# Patient Record
Sex: FEMALE | Race: WHITE | Employment: FULL TIME | URBAN - METROPOLITAN AREA
[De-identification: names, ages, dates, MRNs, and addresses within clinical notes are randomized per-mention and may not be internally consistent; named-entity substitution may affect disease eponyms.]

---

## 2017-04-17 ENCOUNTER — OFFICE VISIT (OUTPATIENT)
Dept: FAMILY MEDICINE CLINIC | Age: 37
End: 2017-04-17

## 2017-04-17 VITALS
DIASTOLIC BLOOD PRESSURE: 70 MMHG | BODY MASS INDEX: 21.98 KG/M2 | SYSTOLIC BLOOD PRESSURE: 100 MMHG | HEART RATE: 125 BPM | TEMPERATURE: 97.8 F | WEIGHT: 153.2 LBS

## 2017-04-17 DIAGNOSIS — J40 BRONCHITIS: Primary | ICD-10-CM

## 2017-04-17 DIAGNOSIS — R00.2 PALPITATIONS: ICD-10-CM

## 2017-04-17 PROCEDURE — 99213 OFFICE O/P EST LOW 20 MIN: CPT | Performed by: FAMILY MEDICINE

## 2017-04-17 RX ORDER — AZITHROMYCIN 250 MG/1
TABLET, FILM COATED ORAL
Qty: 1 PACKET | Refills: 0 | Status: SHIPPED | OUTPATIENT
Start: 2017-04-17 | End: 2017-04-27

## 2017-04-18 ENCOUNTER — TELEPHONE (OUTPATIENT)
Dept: FAMILY MEDICINE CLINIC | Age: 37
End: 2017-04-18

## 2017-04-25 ENCOUNTER — TELEPHONE (OUTPATIENT)
Dept: FAMILY MEDICINE CLINIC | Age: 37
End: 2017-04-25

## 2017-04-25 NOTE — TELEPHONE ENCOUNTER
Patient called to let you know she has rescheduled her Echocardiogram for next week (from tomorrow). She wanted to make sure the Prior Auth was done so she would not have to pay out of pocket.

## 2017-04-25 NOTE — TELEPHONE ENCOUNTER
The patient called to check on the status of a prior authorization that was needed for an echocardiogram she was needing to have performed. I informed her that it was denied intitally by her insurance company and they were requesting to speak with the order physician to see if it could be approved. I let her know that Dr. Teetee Thompson was out of the office today and would be back tomorrow to look over this. She has her test scheduled for 9 am tomorrow morning and wasn't sure if she would need to go ahead and reschedule it so she wouldn't have to pay out of pocket. Please let her know if she should reschedule for a later date.

## 2017-04-25 NOTE — TELEPHONE ENCOUNTER
Lazara Barakat with [facility] is calling regarding a echo cardiogram the patient is having completed at their facility tomorrow. They are needing to have a Prior Authorization sent to them in order for the patient to have this completed. Please give them a call if you have any further questions regarding this.

## 2017-04-25 NOTE — TELEPHONE ENCOUNTER
i called samuel they denied the echo PA  Said if the ordering doctor would like to call and talk the number is [phone]  They said it did not meet medical criteria

## 2017-04-26 NOTE — TELEPHONE ENCOUNTER
I called and received approval   Valid 4/25-5/24/17  #[id number]    Of course, as they always say, subject to any co-pays or deductible that is applicable

## 2017-04-28 ENCOUNTER — TELEPHONE (OUTPATIENT)
Dept: FAMILY MEDICINE CLINIC | Age: 37
End: 2017-04-28

## 2017-04-28 NOTE — TELEPHONE ENCOUNTER
I only wrote one order and it is in Epic: ECHO complete 2D with doppler  The PA approval says transthoracic, but my order does not (though I don;t see the difference as they are the same- the ABRIL- transesophageal ECHO- is the different one)

## 2017-04-28 NOTE — TELEPHONE ENCOUNTER
Shruthi was gone for the day, however scheduling was aware of this and I did advise of Dr. Edmundo Coker note that our order was for the ECHO complete 2D with Doppler. She said that was all she needed and this would be taken care of.

## 2017-04-28 NOTE — TELEPHONE ENCOUNTER
Shruthi with [facility] is calling regarding the order they have for the patients upcoming echo cardiogram.  She said that in addition to the order they received for the Echo Complete 2D w/doppler , they also received an order for a  Resting transthoracic  Echo. She would like to verify exactly which order Dr. Alice Flores would like the patient to receive.

## 2017-05-05 ENCOUNTER — TELEPHONE (OUTPATIENT)
Dept: FAMILY MEDICINE CLINIC | Age: 37
End: 2017-05-05

## 2017-05-17 ENCOUNTER — OFFICE VISIT (OUTPATIENT)
Dept: FAMILY MEDICINE CLINIC | Age: 37
End: 2017-05-17

## 2017-05-17 VITALS
DIASTOLIC BLOOD PRESSURE: 50 MMHG | OXYGEN SATURATION: 99 % | WEIGHT: 156.8 LBS | SYSTOLIC BLOOD PRESSURE: 90 MMHG | HEART RATE: 93 BPM | BODY MASS INDEX: 22.5 KG/M2

## 2017-05-17 DIAGNOSIS — I47.1 PSVT (PAROXYSMAL SUPRAVENTRICULAR TACHYCARDIA) (HCC): Primary | ICD-10-CM

## 2017-05-17 DIAGNOSIS — Z3A.25 25 WEEKS GESTATION OF PREGNANCY: ICD-10-CM

## 2017-05-17 PROCEDURE — 99214 OFFICE O/P EST MOD 30 MIN: CPT | Performed by: FAMILY MEDICINE

## 2017-05-17 ASSESSMENT — ENCOUNTER SYMPTOMS
SHORTNESS OF BREATH: 0
ABDOMINAL PAIN: 0
CHEST TIGHTNESS: 0

## 2018-07-26 ENCOUNTER — OFFICE VISIT (OUTPATIENT)
Dept: FAMILY MEDICINE CLINIC | Age: 38
End: 2018-07-26

## 2018-07-26 VITALS
BODY MASS INDEX: 18.98 KG/M2 | HEIGHT: 71 IN | TEMPERATURE: 98.2 F | HEART RATE: 97 BPM | WEIGHT: 135.6 LBS | RESPIRATION RATE: 12 BRPM | OXYGEN SATURATION: 99 % | DIASTOLIC BLOOD PRESSURE: 60 MMHG | SYSTOLIC BLOOD PRESSURE: 100 MMHG

## 2018-07-26 DIAGNOSIS — R19.7 DIARRHEA OF PRESUMED INFECTIOUS ORIGIN: Primary | ICD-10-CM

## 2018-07-26 DIAGNOSIS — F41.8 SITUATIONAL ANXIETY: ICD-10-CM

## 2018-07-26 PROCEDURE — 99213 OFFICE O/P EST LOW 20 MIN: CPT | Performed by: PHYSICIAN ASSISTANT

## 2018-07-26 RX ORDER — ONDANSETRON 4 MG/1
4 TABLET, ORALLY DISINTEGRATING ORAL EVERY 8 HOURS PRN
Qty: 15 TABLET | Refills: 1 | Status: SHIPPED | OUTPATIENT
Start: 2018-07-26 | End: 2019-10-09

## 2018-07-26 ASSESSMENT — PATIENT HEALTH QUESTIONNAIRE - PHQ9
2. FEELING DOWN, DEPRESSED OR HOPELESS: 0
SUM OF ALL RESPONSES TO PHQ QUESTIONS 1-9: 0
SUM OF ALL RESPONSES TO PHQ9 QUESTIONS 1 & 2: 0
1. LITTLE INTEREST OR PLEASURE IN DOING THINGS: 0

## 2018-07-26 NOTE — PATIENT INSTRUCTIONS
Out of work today. Increase fluids. Use Zofran for nausea. Evaristo Willis diarPatient Education        Diarrhea: Care Instructions  Your Care Instructions    Diarrhea is loose, watery stools (bowel movements). The exact cause is often hard to find. Sometimes diarrhea is your body's way of getting rid of what caused an upset stomach. Viruses, food poisoning, and many medicines can cause diarrhea. Some people get diarrhea in response to emotional stress, anxiety, or certain foods. Almost everyone has diarrhea now and then. It usually isn't serious, and your stools will return to normal soon. The important thing to do is replace the fluids you have lost, so you can prevent dehydration. The doctor has checked you carefully, but problems can develop later. If you notice any problems or new symptoms, get medical treatment right away. Follow-up care is a key part of your treatment and safety. Be sure to make and go to all appointments, and call your doctor if you are having problems. It's also a good idea to know your test results and keep a list of the medicines you take. How can you care for yourself at home? · Watch for signs of dehydration, which means your body has lost too much water. Dehydration is a serious condition and should be treated right away. Signs of dehydration are:  ¨ Increasing thirst and dry eyes and mouth. ¨ Feeling faint or lightheaded. ¨ Darker urine, and a smaller amount of urine than normal.  · To prevent dehydration, drink plenty of fluids, enough so that your urine is light yellow or clear like water. Choose water and other caffeine-free clear liquids until you feel better. If you have kidney, heart, or liver disease and have to limit fluids, talk with your doctor before you increase the amount of fluids you drink. · Begin eating small amounts of mild foods the next day, if you feel like it. ¨ Try yogurt that has live cultures of Lactobacillus.  (Check the label.)  ¨ Avoid spicy foods, fruits,

## 2018-11-12 ENCOUNTER — OFFICE VISIT (OUTPATIENT)
Dept: FAMILY MEDICINE CLINIC | Age: 38
End: 2018-11-12
Payer: MEDICARE

## 2018-11-12 VITALS
BODY MASS INDEX: 19.49 KG/M2 | RESPIRATION RATE: 18 BRPM | HEIGHT: 71 IN | HEART RATE: 67 BPM | WEIGHT: 139.2 LBS | DIASTOLIC BLOOD PRESSURE: 72 MMHG | OXYGEN SATURATION: 98 % | SYSTOLIC BLOOD PRESSURE: 112 MMHG | TEMPERATURE: 98.1 F

## 2018-11-12 DIAGNOSIS — J20.9 ACUTE BRONCHITIS, UNSPECIFIED ORGANISM: Primary | ICD-10-CM

## 2018-11-12 PROCEDURE — 99213 OFFICE O/P EST LOW 20 MIN: CPT | Performed by: PHYSICIAN ASSISTANT

## 2018-11-12 RX ORDER — BENZONATATE 100 MG/1
100 CAPSULE ORAL 3 TIMES DAILY PRN
Qty: 30 CAPSULE | Refills: 0 | Status: SHIPPED | OUTPATIENT
Start: 2018-11-12 | End: 2019-10-09 | Stop reason: ALTCHOICE

## 2018-11-12 RX ORDER — AZITHROMYCIN 250 MG/1
TABLET, FILM COATED ORAL
Qty: 1 PACKET | Refills: 0 | Status: SHIPPED | OUTPATIENT
Start: 2018-11-12 | End: 2019-10-09 | Stop reason: ALTCHOICE

## 2019-10-09 ENCOUNTER — OFFICE VISIT (OUTPATIENT)
Dept: FAMILY MEDICINE CLINIC | Age: 39
End: 2019-10-09
Payer: MEDICARE

## 2019-10-09 VITALS
BODY MASS INDEX: 21.61 KG/M2 | SYSTOLIC BLOOD PRESSURE: 98 MMHG | TEMPERATURE: 98.1 F | HEIGHT: 71 IN | HEART RATE: 69 BPM | OXYGEN SATURATION: 100 % | WEIGHT: 154.4 LBS | RESPIRATION RATE: 16 BRPM | DIASTOLIC BLOOD PRESSURE: 58 MMHG

## 2019-10-09 DIAGNOSIS — K58.0 IRRITABLE BOWEL SYNDROME WITH DIARRHEA: Primary | ICD-10-CM

## 2019-10-09 PROBLEM — I47.1 PSVT (PAROXYSMAL SUPRAVENTRICULAR TACHYCARDIA) (HCC): Status: ACTIVE | Noted: 2019-10-09

## 2019-10-09 PROBLEM — H52.13 MYOPIA, BILATERAL: Status: ACTIVE | Noted: 2019-10-09

## 2019-10-09 PROBLEM — H40.1131 PRIMARY OPEN ANGLE GLAUCOMA OF BOTH EYES, MILD STAGE: Status: ACTIVE | Noted: 2019-10-09

## 2019-10-09 PROBLEM — I47.10 PSVT (PAROXYSMAL SUPRAVENTRICULAR TACHYCARDIA): Status: ACTIVE | Noted: 2019-10-09

## 2019-10-09 PROCEDURE — 99213 OFFICE O/P EST LOW 20 MIN: CPT | Performed by: PHYSICIAN ASSISTANT

## 2019-10-09 ASSESSMENT — PATIENT HEALTH QUESTIONNAIRE - PHQ9
2. FEELING DOWN, DEPRESSED OR HOPELESS: 0
SUM OF ALL RESPONSES TO PHQ QUESTIONS 1-9: 0
1. LITTLE INTEREST OR PLEASURE IN DOING THINGS: 0
SUM OF ALL RESPONSES TO PHQ9 QUESTIONS 1 & 2: 0
SUM OF ALL RESPONSES TO PHQ QUESTIONS 1-9: 0

## 2019-11-25 ENCOUNTER — OFFICE VISIT (OUTPATIENT)
Dept: FAMILY MEDICINE CLINIC | Age: 39
End: 2019-11-25
Payer: MEDICARE

## 2019-11-25 VITALS
WEIGHT: 155 LBS | OXYGEN SATURATION: 99 % | DIASTOLIC BLOOD PRESSURE: 68 MMHG | SYSTOLIC BLOOD PRESSURE: 99 MMHG | BODY MASS INDEX: 21.7 KG/M2 | HEART RATE: 74 BPM | HEIGHT: 71 IN

## 2019-11-25 DIAGNOSIS — I47.1 PSVT (PAROXYSMAL SUPRAVENTRICULAR TACHYCARDIA) (HCC): ICD-10-CM

## 2019-11-25 DIAGNOSIS — H40.1131 PRIMARY OPEN ANGLE GLAUCOMA OF BOTH EYES, MILD STAGE: ICD-10-CM

## 2019-11-25 DIAGNOSIS — Z00.00 ROUTINE GENERAL MEDICAL EXAMINATION AT A HEALTH CARE FACILITY: Primary | ICD-10-CM

## 2019-11-25 DIAGNOSIS — Z86.19 H/O VARICELLA: ICD-10-CM

## 2019-11-25 LAB
A/G RATIO: 2 (ref 1.1–2.2)
ALBUMIN SERPL-MCNC: 4.8 G/DL (ref 3.4–5)
ALP BLD-CCNC: 50 U/L (ref 40–129)
ALT SERPL-CCNC: 12 U/L (ref 10–40)
ANION GAP SERPL CALCULATED.3IONS-SCNC: 16 MMOL/L (ref 3–16)
AST SERPL-CCNC: 16 U/L (ref 15–37)
BASOPHILS ABSOLUTE: 0 K/UL (ref 0–0.2)
BASOPHILS RELATIVE PERCENT: 0.7 %
BILIRUB SERPL-MCNC: 0.6 MG/DL (ref 0–1)
BUN BLDV-MCNC: 9 MG/DL (ref 7–20)
CALCIUM SERPL-MCNC: 9.1 MG/DL (ref 8.3–10.6)
CHLORIDE BLD-SCNC: 99 MMOL/L (ref 99–110)
CHOLESTEROL, TOTAL: 157 MG/DL (ref 0–199)
CO2: 24 MMOL/L (ref 21–32)
CREAT SERPL-MCNC: 0.7 MG/DL (ref 0.6–1.1)
EOSINOPHILS ABSOLUTE: 0.1 K/UL (ref 0–0.6)
EOSINOPHILS RELATIVE PERCENT: 1.3 %
GFR AFRICAN AMERICAN: >60
GFR NON-AFRICAN AMERICAN: >60
GLOBULIN: 2.4 G/DL
GLUCOSE BLD-MCNC: 93 MG/DL (ref 70–99)
HCT VFR BLD CALC: 42.4 % (ref 36–48)
HDLC SERPL-MCNC: 77 MG/DL (ref 40–60)
HEMOGLOBIN: 14.2 G/DL (ref 12–16)
LDL CHOLESTEROL CALCULATED: 64 MG/DL
LYMPHOCYTES ABSOLUTE: 1.5 K/UL (ref 1–5.1)
LYMPHOCYTES RELATIVE PERCENT: 25.3 %
MCH RBC QN AUTO: 30.3 PG (ref 26–34)
MCHC RBC AUTO-ENTMCNC: 33.5 G/DL (ref 31–36)
MCV RBC AUTO: 90.5 FL (ref 80–100)
MONOCYTES ABSOLUTE: 0.4 K/UL (ref 0–1.3)
MONOCYTES RELATIVE PERCENT: 6.8 %
NEUTROPHILS ABSOLUTE: 3.9 K/UL (ref 1.7–7.7)
NEUTROPHILS RELATIVE PERCENT: 65.9 %
PDW BLD-RTO: 12.8 % (ref 12.4–15.4)
PLATELET # BLD: 178 K/UL (ref 135–450)
PMV BLD AUTO: 9.2 FL (ref 5–10.5)
POTASSIUM SERPL-SCNC: 3.7 MMOL/L (ref 3.5–5.1)
RBC # BLD: 4.69 M/UL (ref 4–5.2)
SODIUM BLD-SCNC: 139 MMOL/L (ref 136–145)
TOTAL PROTEIN: 7.2 G/DL (ref 6.4–8.2)
TRIGL SERPL-MCNC: 81 MG/DL (ref 0–150)
TSH SERPL DL<=0.05 MIU/L-ACNC: 1.59 UIU/ML (ref 0.27–4.2)
VLDLC SERPL CALC-MCNC: 16 MG/DL
WBC # BLD: 5.9 K/UL (ref 4–11)

## 2019-11-25 PROCEDURE — 36415 COLL VENOUS BLD VENIPUNCTURE: CPT | Performed by: FAMILY MEDICINE

## 2019-11-25 PROCEDURE — 99395 PREV VISIT EST AGE 18-39: CPT | Performed by: FAMILY MEDICINE

## 2019-11-25 RX ORDER — METOPROLOL SUCCINATE 50 MG/1
50 TABLET, EXTENDED RELEASE ORAL DAILY
COMMUNITY

## 2020-01-09 ENCOUNTER — TELEPHONE (OUTPATIENT)
Dept: FAMILY MEDICINE CLINIC | Age: 40
End: 2020-01-09

## 2020-02-03 ENCOUNTER — OFFICE VISIT (OUTPATIENT)
Dept: ORTHOPEDIC SURGERY | Age: 40
End: 2020-02-03
Payer: MEDICARE

## 2020-02-03 VITALS — RESPIRATION RATE: 16 BRPM | WEIGHT: 151 LBS | HEIGHT: 71 IN | BODY MASS INDEX: 21.14 KG/M2

## 2020-02-03 PROCEDURE — 99203 OFFICE O/P NEW LOW 30 MIN: CPT | Performed by: PHYSICIAN ASSISTANT

## 2020-02-03 PROCEDURE — L1812 KO ELASTIC W/JOINTS PRE OTS: HCPCS | Performed by: PHYSICIAN ASSISTANT

## 2020-02-03 NOTE — PROGRESS NOTES
Patient: Stormy Mishra    MRN: [id number]  YOB: 1980          Age: 42 y.o. Sex: female    Subjective     Chief Complaint:  Knee Pain (LT KNEE: FELL THIS MORNING WHEN SHE WAS LEAVING FOR WORK. PATELLA DISLOCATION, STATES SHE MANUALLY RELOVATED IT.)      History of Present Illness:  Stormy Mishra is a 42 y.o. female who presents today for evaluation of left anterior knee pain. Patient states that she was leaving this morning to go to work when she was stepped out into her garage and her knee gave way and she fell onto her child's diaper bag. She states that her kneecap was visibly lateral in her knee. She did physically reduce the patella and was able to straighten her leg. She did have immediate swelling. Since the injury she has been ambulating with a considerable limp secondary to left knee pain. She states that she has had a previous patellar dislocation over 20 years ago. Pain at the present time is over the anterior medial aspect of the left knee within the medial patellofemoral joint.     Pain Assessment  Location of Pain: Knee  Location Modifiers: Left  Severity of Pain: 2  Quality of Pain: Aching, Dull, Grinding, Other (Comment), Buckling  Duration of Pain: Persistent  Frequency of Pain: Constant  Date Pain First Started: 02/03/20  Aggravating Factors: Stretching, Straightening, Bending, Exercise, Kneeling, Squatting, Standing, Walking, Stairs  Limiting Behavior: Yes  Relieving Factors: Rest  Result of Injury: Yes  Work-Related Injury: No  Are there other pain locations you wish to document?: No      Medical History  Current Medications:   Current Outpatient Medications   Medication Sig Dispense Refill    metoprolol succinate (TOPROL XL) 50 MG extended release tablet Take 50 mg by mouth daily      hyoscyamine (LEVSIN/SL) 125 MCG sublingual tablet Place 1 tablet under the tongue every 4 hours as needed for Cramping 60 tablet 1    Latanoprost (XALATAN OP) Apply instability noted with varus and valgus stress testing or with anterior posterior stress testing. Skin: There are no rashes, ulcerations or lesions    Gait: With a limp favoring the left knee    Distal neurovascular is grossly intact    Radiology:  X-rays obtained and reviewed in office:  Views: AP, lateral, sunrise view, tunnel view left knee  Location(s): Left knee  Impression: There is slight patellar tilt noted on the sunrise view with a small bone fragment noted over the medial patellar facet    Assessment:  Left knee patellar dislocation    Impression:   Encounter Diagnoses   Name Primary?  Left knee pain, unspecified chronicity Yes    Dislocation of left patella, initial encounter        Office Procedures:  Orders Placed This Encounter   Procedures    XR KNEE LEFT (MIN 4 VIEWS)     Standing Status:   Future     Number of Occurrences:   1     Standing Expiration Date:   3/3/2020   [address] [place] [address] , Orthopedic Surgery, [facility]     Referral Priority:   Routine     Referral Type:   Eval and Treat     Referral Reason:   Specialty Services Required     Referred to Provider:   Indigo Begum DO     Requested Specialty:   Orthopedic Surgery     Number of Visits Requested:   1    Breg Hinged Lateral Stabilizer Knee Brace     Patient was prescribed a Breg Hinged Lateral Stabilizer. The left knee will require stabilization / immobilization from this semi-rigid / rigid orthosis to improve their function. The orthosis will assist in protecting the affected area, provide functional support and facilitate healing. The patient was educated and fit by a healthcare professional with expert knowledge and specialization in brace application while under the direct supervision of the physician. Verbal and written instructions for the use of and application of this item were provided.    They were instructed to contact the office immediately should the brace result in increased pain, decreased

## 2020-02-17 ENCOUNTER — OFFICE VISIT (OUTPATIENT)
Dept: ORTHOPEDIC SURGERY | Age: 40
End: 2020-02-17
Payer: COMMERCIAL

## 2020-02-17 VITALS — HEIGHT: 71 IN | BODY MASS INDEX: 21.14 KG/M2 | WEIGHT: 151.01 LBS

## 2020-02-17 PROBLEM — S83.005A PATELLAR DISLOCATION, LEFT, INITIAL ENCOUNTER: Status: ACTIVE | Noted: 2020-02-17

## 2020-02-17 PROCEDURE — 99203 OFFICE O/P NEW LOW 30 MIN: CPT | Performed by: ORTHOPAEDIC SURGERY

## 2020-02-17 NOTE — PROGRESS NOTES
MCL, PCL, LCL are intact with stress exam.  There negative crepitus noted with range of motion under the patella. A positive grind test.  negative Thania's and Apley compression test.   Patient has increased mobility with lateral translation of the patella, 3+ quadrants with some mild apprehension. Skin: There are no rashes, ulcerations or lesions. Gait: Normal    Reflex: not tested    Additional Examinations:  Right Lower Extremity: Examination of the right lower extremity does not show any tenderness, deformity or injury. Range of motion is unremarkable. There is no gross instability. There are no rashes, ulcerations or lesions. Strength and tone are normal.  Patient is hypermobile with ability to hyperextend elbows, has hyperextension past 90 degrees at the pinky, hyperextension at the knees and increased patellar mobility on the right as well as left. LUMBAR SPINE: The skin is warm and dry. There is no swelling, warmth, or erythema. Range of motion is within normal limits. There is no paraspinal or spinous process tenderness. Ipsilateral and contralateral straight leg raising tests are negative. The distal neurovascular exam is grossly intact and symmetric. X-RAYS: 4 views weightbearing AP, lateral. PA and sunrise of the left knee were reviewed, they show no periosteal reaction, medullary lesions, or osteopenia. Joint spaces are well maintained. No evidence of fracture or dislocation. Assessment : Left knee patella dislocation    Impression:  Encounter Diagnosis   Name Primary?     Patellar dislocation, left, initial encounter Yes       Office Procedures:  Orders Placed This Encounter   Procedures    [address] [place] [address] Physical Therapy     Referral Priority:   Routine     Referral Type:   Eval and Treat     Referral Reason:   Specialty Services Required     Requested Specialty:   Physical Therapy     Number of Visits Requested:   1     No orders of the defined types were placed in this encounter. Treatment Plan: We reviewed patient's injury and treatment options. This is first dislocation in at least 25 years. Patient will proceed with conservative treatment including a patella stabilizing brace and physical therapy. If she sustains further dislocations we will consider MRI for surgical intervention. Patient agrees with this plan, all of their questions were answered best of our ability and to their satisfaction.         Debo Martinez

## 2020-02-20 ENCOUNTER — HOSPITAL ENCOUNTER (OUTPATIENT)
Dept: PHYSICAL THERAPY | Age: 40
Setting detail: THERAPIES SERIES
Discharge: HOME | End: 2020-02-20
Payer: COMMERCIAL

## 2020-02-20 PROCEDURE — 97161 PT EVAL LOW COMPLEX 20 MIN: CPT

## 2020-02-20 PROCEDURE — 97110 THERAPEUTIC EXERCISES: CPT

## 2020-02-20 PROCEDURE — 97140 MANUAL THERAPY 1/> REGIONS: CPT

## 2020-02-20 NOTE — PLAN OF CARE
throughout but no EDS diagnosis. Patient reports less active since having first child 4 years    Relevant Medical History:pripr dislocation at 17 yo no sx  Functional Disability Index: LEFS 31%    Pain Scale: 5-6/10  Easing factors: rest, ice, brace  Provocative factors: weight bearing out of brace     Type: []Constant   [x]Intermittent  []Radiating []Localized []other:     Numbness/Tingling: none    Occupation/School: desk job    Living Status/Prior Level of Function: Independent with ADLs and IADLs, caring for young children    OBJECTIVE:     ROM LEFT RIGHT   Knee ext 0 +2   Knee Flex 140 140   Strength  LEFT RIGHT   HIP Abductors 4- 4+   HIP Ext 4 4+   Knee EXT (quad) 4- poor VMO 5   Knee Flex (HS) 4+ 5     Reflexes/Sensation:  NT   []Dermatomes/Myotomes intact    []Reflexes equal and normal bilaterally   []Other:    Joint mobility:    []Normal    []Hypo   [x]Hyper 4+ mm lateral translation of patella    Palpation: TTP around medial aspect of knee, mild effusion present    Functional Mobility/Transfers: independent    Posture: anterior pelvic tilt, genu valgum in single leg stance    Bandages/Dressings/Incisions: n/a    Gait: (include devices/WB status) slight antalgic gait, decreased stance time on L LE, decreased knee flexion on L LE    Orthopedic Special Tests: n/a                       [x] Patient history, allergies, meds reviewed. Medical chart reviewed. See intake form. Review Of Systems (ROS):  [x]Performed Review of systems (Integumentary, CardioPulmonary, Neurological) by intake and observation. Intake form has been scanned into medical record. Patient has been instructed to contact their primary care physician regarding ROS issues if not already being addressed at this time.       Co-morbidities/Complexities (which will affect course of rehabilitation):   [x]None           Arthritic conditions   []Rheumatoid arthritis (M05.9)  []Osteoarthritis (M19.91)   Cardiovascular conditions   []Hypertension Goals: To be achieved in: 10 weeks  1. Disability index score of 20% or less for the LEFS to assist with reaching prior level of function. [] Progressing: [] Met: [] Not Met: [] Adjusted  2. Patient will demonstrate increased AROM to 0-145 painfree to allow for proper joint functioning as indicated by patients Functional Deficits. [] Progressing: [] Met: [] Not Met: [] Adjusted  3. Patient will demonstrate an increase in Strength to good proximal hip strength and control, at least 4+/5 LE to allow for proper functional mobility as indicated by patients Functional Deficits. [] Progressing: [] Met: [] Not Met: [] Adjusted  4. Patient will return to  activities without increased symptoms or restriction. [] Progressing: [] Met: [] Not Met: [] Adjusted  5. Patient will be able to ascend/descend stairs with reciprocal pattern for return to normal ADLs.    [] Progressing: [] Met: [] Not Met: [] Adjusted      Electronically signed by:  Oracio Pope, [address] [facility] DPT, [id number]

## 2020-02-20 NOTE — FLOWSHEET NOTE
RESTRICTIONS/PRECAUTIONS: patellar dislocation    Exercises/Interventions:     Therapeutic Ex Sets/reps Notes HEP   Quad set with ADD 10 x 10\"  X   SLR/SLR in ER 15 x Fatigue at end, pain at end of ER X      X   Clamshells 2 x 12  X   Bridges with ADD 2 x 12  X      X                                                               Manual Intervention      PROM, ITB STM 5 min     Pt ed anatomy, surgery, RICE, PT progression, prognosis 10 min                             NMR re-education                                                              Access Code: [email or web address]   URL: [email or web address]   Date: 02/20/2020   Prepared by: Colin Amador     Exercises  Supine Straight Leg Hip Adduction and Quad Set with Graham Hoes - 10 reps - 10 seconds hold - 1-2x daily  Supine Straight Leg Raises - 10 reps - 2 sets - 1-2x daily  Straight Leg Raise with External Rotation - 10 reps - 2 sets - 1-2x daily  Supine Bridge with Mini Swiss Ball Between Knees - 10 reps - 2 sets - 3 seconds hold - 1-2x daily  Clamshell with Resistance - 10 reps - 2 sets - 1-2x daily    Therapeutic Exercise and NMR EXR  [x] (55436) Provided verbal/tactile cueing for activities related to strengthening, flexibility, endurance, ROM for improvements in LE, proximal hip, and core control with self care, mobility, lifting, ambulation.  [] (66136) Provided verbal/tactile cueing for activities related to improving balance, coordination, kinesthetic sense, posture, motor skill, proprioception  to assist with LE, proximal hip, and core control in self care, mobility, lifting, ambulation and eccentric single leg control.      NMR and Therapeutic Activities:    [x] (30427 or 89816) Provided verbal/tactile cueing for activities related to improving balance, coordination, kinesthetic sense, posture, motor skill, proprioception and motor activation to allow for proper function of core, proximal hip and LE with self care and ADLs  [] (94334) Gait Re-education- Provided training and instruction to the patient for proper LE, core and proximal hip recruitment and positioning and eccentric body weight control with ambulation re-education including up and down stairs     Home Exercise Program:    [x] (56572) Reviewed/Progressed HEP activities related to strengthening, flexibility, endurance, ROM of core, proximal hip and LE for functional self-care, mobility, lifting and ambulation/stair navigation   [] (39474)Reviewed/Progressed HEP activities related to improving balance, coordination, kinesthetic sense, posture, motor skill, proprioception of core, proximal hip and LE for self care, mobility, lifting, and ambulation/stair navigation      Manual Treatments:  PROM / STM / Oscillations-Mobs:  G-I, II, III, IV (PA's, Inf., Post.)  [x] (50903) Provided manual therapy to mobilize LE, proximal hip and/or LS spine soft tissue/joints for the purpose of modulating pain, promoting relaxation,  increasing ROM, reducing/eliminating soft tissue swelling/inflammation/restriction, improving soft tissue extensibility and allowing for proper ROM for normal function with self care, mobility, lifting and ambulation. Modalities:       [] GR/ESU 15 min    [] GR 15 min  [] ESU     [] CP    [] MHP    [x] declined     Charges:  Timed Code Treatment Minutes: 40   Total Treatment Minutes: 60     [x] EVAL (LOW) 56030 (typically 20 minutes face-to-face)  [] EVAL (MOD) 30026 (typically 30 minutes face-to-face)  [] EVAL (HIGH) 85424 (typically 45 minutes face-to-face)  [] RE-EVAL     [x] SV(97477) x  2   [] IONTO  [] NMR (39645) x     [] VASO  [x] Manual (81927) x 1    [] Other:  [] TA x      [] Mech Traction (38775)  [] ES(attended) (49566)      [] ES (un) (30769):      GOALS: Patient stated goal: Avoid surgery and reduce risk of dislocating again  []? Progressing: []? Met: []? Not Met: []? Adjusted     Therapist goals for Patient:   Short Term Goals: To be achieved in: 2 weeks  1.  Independent in Demonstrates poor VMO firing and atrophy. Hypermobility of patella, lateral tightness. Patient would benefit from skilled PT to increase strength and control of knee to reduce risk of recurrent patella dislocations and avoid surgical procedure. Treatment/Activity Tolerance:  [x] Patient tolerated treatment well [] Patient limited by fatigue  [] Patient limited by pain  [] Patient limited by other medical complications  [] Other:     Patient Requires Follow-up: [x] Yes  [] No    PLAN: See eval  [] Continue per plan of care [] Alter current plan (see comments above)  [x] Plan of care initiated [] Hold pending MD visit [] Discharge      Electronically signed by:  Sue Barakat PT , DPT [id number]    Note: If patient does not return for scheduled/ recommended follow up visits, this note will serve as a discharge from care along with most recent update on progress.

## 2020-02-26 ENCOUNTER — HOSPITAL ENCOUNTER (OUTPATIENT)
Dept: PHYSICAL THERAPY | Age: 40
Setting detail: THERAPIES SERIES
Discharge: HOME | End: 2020-02-26
Payer: COMMERCIAL

## 2020-02-26 PROCEDURE — 97140 MANUAL THERAPY 1/> REGIONS: CPT

## 2020-02-26 PROCEDURE — 97110 THERAPEUTIC EXERCISES: CPT

## 2020-02-26 NOTE — FLOWSHEET NOTE
[facility],  [address]  Phone: [phone]  Fax [phone]    Physical Therapy Daily Treatment Note  Date:  2020    Patient Name:  Hyun Porter    :  1980  MRN: [id number]  Restrictions/Precautions:    Physician Information:  Referring Practitioner: Dr. Vineet Orona  Medical/Treatment Diagnosis Information:  · Diagnosis: S83.005A (ICD-10-CM) - Patellar dislocation, left, initial encounter  · Treatment Diagnosis: Left patellar dislocation S83.005D, left knee pain M25.562, left knee stiffness M25.662, left knee effusion M25.662   [x] Conservative / [] Surgical - DOS:  Therapy Diagnosis/Practice Pattern:  Practice Pattern D: Connective Tissue Dysfunction  Insurance/Certification information:  PT Insurance Information: [employer]  $10CP-100%- 20PT-NEEDS AUTH AFTER 10V  Plan of care signed: [x] YES  [] NO  Number of Comorbidities:  []0     [x]1-2    []3+  Date of Patient follow up with Physician:     Is this a Progress Report:     []  Yes  [x]  No        If Yes:  Date Range for reporting period:  Beginning 2020  Ending    Progress report will be due (10 Rx or 30 days whichever is less):        Recertification will be due (POC Duration  / 90 days whichever is less): 2020     Progress Note: [x]  Yes  []  No  Next due by: Visit #10        Latex Allergy:  [x]NO      []YES  Preferred Language for Healthcare:   [x]English       []other:    Visit # Insurance Allowable Reporting Period    (auth after 10) Begin Date: 2020               End Date:        SUBJECTIVE:  Reports HEP got easier the more she did it.  Still uncomfortable coming out of brace at this point    OBJECTIVE: See eval   Observation:   Palpation:     Test used Initial score Current Score   Pain Summary VAS 5-6    Functional questionnaire LEFS 31%    ROM flexion 140 slight discomfort at end     extension 0    Strength quad 4- poor VMO     ABD 4-     flexion 4+         RESTRICTIONS/PRECAUTIONS: patellar dislocation    Exercises/Interventions:     Therapeutic Ex Sets/reps Notes HEP   Quad set with ADD 10 x 10\"  X   SLR/SLR in ER 2 x 10 each Fatigue at end, pain at end of ER X   SL ABD 15 x   X   Clamshells 2 x 12 OVL X   Bridges with ADD 2 x 12  X   LBW 2 laps OVL X   Lateral touch down 2 x 10 4\" cues for valgum    Leg press 5 min 40# with ADD squeeze                                                    Manual Intervention      PROM, ITB STM 8 min     Pt ed anatomy, surgery, RICE, PT progression, prognosis                              NMR re-education                                                              Access Code: [email or web address]   URL: [email or web address]   Date: 02/20/2020   Prepared by: Colin Amador     Exercises  Supine Straight Leg Hip Adduction and Quad Set with Graham Hoes - 10 reps - 10 seconds hold - 1-2x daily  Supine Straight Leg Raises - 10 reps - 2 sets - 1-2x daily  Straight Leg Raise with External Rotation - 10 reps - 2 sets - 1-2x daily  Supine Bridge with Mini Swiss Ball Between Knees - 10 reps - 2 sets - 3 seconds hold - 1-2x daily  Clamshell with Resistance - 10 reps - 2 sets - 1-2x daily    Therapeutic Exercise and NMR EXR  [x] (45953) Provided verbal/tactile cueing for activities related to strengthening, flexibility, endurance, ROM for improvements in LE, proximal hip, and core control with self care, mobility, lifting, ambulation.  [] (98153) Provided verbal/tactile cueing for activities related to improving balance, coordination, kinesthetic sense, posture, motor skill, proprioception  to assist with LE, proximal hip, and core control in self care, mobility, lifting, ambulation and eccentric single leg control.      NMR and Therapeutic Activities:    [x] (28390 or 19812) Provided verbal/tactile cueing for activities related to improving balance, coordination, kinesthetic sense, posture, motor skill, proprioception and motor activation to allow for proper function of core, proximal hip and LE with self care and ADLs  [] (49316) Gait Re-education- Provided training and instruction to the patient for proper LE, core and proximal hip recruitment and positioning and eccentric body weight control with ambulation re-education including up and down stairs     Home Exercise Program:    [x] (41872) Reviewed/Progressed HEP activities related to strengthening, flexibility, endurance, ROM of core, proximal hip and LE for functional self-care, mobility, lifting and ambulation/stair navigation   [] (15133)Reviewed/Progressed HEP activities related to improving balance, coordination, kinesthetic sense, posture, motor skill, proprioception of core, proximal hip and LE for self care, mobility, lifting, and ambulation/stair navigation      Manual Treatments:  PROM / STM / Oscillations-Mobs:  G-I, II, III, IV (PA's, Inf., Post.)  [x] (73510) Provided manual therapy to mobilize LE, proximal hip and/or LS spine soft tissue/joints for the purpose of modulating pain, promoting relaxation,  increasing ROM, reducing/eliminating soft tissue swelling/inflammation/restriction, improving soft tissue extensibility and allowing for proper ROM for normal function with self care, mobility, lifting and ambulation.      Modalities:       [] GR/ESU 15 min    [] GR 15 min  [] ESU     [] CP    [] MHP    [x] declined     Charges:  Timed Code Treatment Minutes: 40   Total Treatment Minutes: 50     [] EVAL (LOW) 32967 (typically 20 minutes face-to-face)  [] EVAL (MOD) 64259 (typically 30 minutes face-to-face)  [] EVAL (HIGH) 70117 (typically 45 minutes face-to-face)  [] RE-EVAL     [x] LJ(46771) x  2   [] IONTO  [] NMR (28184) x     [] VASO  [x] Manual (48248) x 1    [] Other:  [] TA x      [] Mech Traction (20704)  [] ES(attended) (44506)      [] ES (un) (23394):      GOALS: Patient stated goal: Avoid surgery and reduce risk of dislocating again  []? Progressing: []? Met: []? Not Met: []? Adjusted     Therapist goals for Patient:   Short Term Goals: To be achieved in: 2 weeks  1. Independent in HEP and progression per patient tolerance, in order to prevent re-injury. [x]? Progressing: []? Met: []? Not Met: []? Adjusted  2. Patient will have a decrease in pain to facilitate improvement in movement, function, and ADLs as indicated by Functional Deficits. [x]? Progressing: []? Met: []? Not Met: []? Adjusted     Long Term Goals: To be achieved in: 10 weeks  1. Disability index score of 20% or less for the LEFS to assist with reaching prior level of function. [x]? Progressing: []? Met: []? Not Met: []? Adjusted  2. Patient will demonstrate increased AROM to 0-145 painfree to allow for proper joint functioning as indicated by patients Functional Deficits. [x]? Progressing: []? Met: []? Not Met: []? Adjusted  3. Patient will demonstrate an increase in Strength to good proximal hip strength and control, at least 4+/5 LE to allow for proper functional mobility as indicated by patients Functional Deficits. [x]? Progressing: []? Met: []? Not Met: []? Adjusted  4. Patient will return to  activities without increased symptoms or restriction. [x]? Progressing: []? Met: []? Not Met: []? Adjusted  5. Patient will be able to ascend/descend stairs with reciprocal pattern for return to normal ADLs. [x]? Progressing: []? Met: []? Not Met: []? Adjusted      Overall Progression Towards Functional goals/ Treatment Progress Update:  [x] Patient is progressing as expected towards functional goals listed. [] Progression is slowed due to complexities/Impairments listed. [] Progression has been slowed due to co-morbidities.   [] Plan just implemented, too soon to assess goals progression <30days   [] Goals require adjustment due to lack of progress  [] Patient is not progressing as expected and requires additional follow up with physician  []

## 2020-03-04 ENCOUNTER — HOSPITAL ENCOUNTER (OUTPATIENT)
Dept: PHYSICAL THERAPY | Age: 40
Setting detail: THERAPIES SERIES
Discharge: HOME | End: 2020-03-04
Payer: COMMERCIAL

## 2020-03-04 PROCEDURE — 97110 THERAPEUTIC EXERCISES: CPT

## 2020-03-04 PROCEDURE — 97112 NEUROMUSCULAR REEDUCATION: CPT

## 2020-03-04 NOTE — FLOWSHEET NOTE
[facility],  [address]  Phone: [phone]  Fax [phone]    Physical Therapy Daily Treatment Note  Date:  3/4/2020    Patient Name:  Divya Shelley    :  1980  MRN: [id number]  Restrictions/Precautions:    Physician Information:  Referring Practitioner: Dr. Jaswinder Sims  Medical/Treatment Diagnosis Information:  · Diagnosis: S83.005A (ICD-10-CM) - Patellar dislocation, left, initial encounter  · Treatment Diagnosis: Left patellar dislocation S83.005D, left knee pain M25.562, left knee stiffness M25.662, left knee effusion M25.662   [x] Conservative / [] Surgical - DOS:  Therapy Diagnosis/Practice Pattern:  Practice Pattern D: Connective Tissue Dysfunction  Insurance/Certification information:  PT Insurance Information: [employer]  $10CP-100%- 20PT-NEEDS AUTH AFTER 10V  Plan of care signed: [x] YES  [] NO  Number of Comorbidities:  []0     [x]1-2    []3+  Date of Patient follow up with Physician:     Is this a Progress Report:     []  Yes  [x]  No        If Yes:  Date Range for reporting period:  Beginning 2020  Ending    Progress report will be due (10 Rx or 30 days whichever is less): 3/43/7966       Recertification will be due (POC Duration  / 90 days whichever is less): 2020     Progress Note: [x]  Yes  []  No  Next due by: Visit #10        Latex Allergy:  [x]NO      []YES  Preferred Language for Healthcare:   [x]English       []other:    Visit # Insurance Allowable Reporting Period   3 20 (auth after 10) Begin Date: 3/4/2020               End Date:        SUBJECTIVE:  Reports some achyness in knee. Using brace most of time. Was more active over weekend and it swelled some, did not ice.     OBJECTIVE: See eval   Observation:   Palpation:     Test used Initial score Current Score   Pain Summary VAS 5-6    Functional questionnaire LEFS 31%    ROM flexion 140 slight discomfort at end     extension 0    Strength quad 4- poor VMO     ABD 4-     flexion 4+         RESTRICTIONS/PRECAUTIONS: patellar dislocation    Exercises/Interventions:     Therapeutic Ex/NMR Sets/reps Notes HEP   Quad set with ADD 10 x 10\"  X   SLR/SLR in ER 2 x 10 each Fatigue at end, pain at end of ER X   SL ABD 15 x   X   Clamshells 2 x 12 OVL X   Bridges with ADD 2 x 12  X   LBW  [place] 2 laps  15 x OVL X   Lateral touch down 2 x 10 4\" cues for valgum    Leg press 5 min 40# with ADD squeeze                                                    Manual Intervention      PROM, ITB STM 5 min     Pt ed anatomy, surgery, RICE, PT progression, prognosis                                                                                            Access Code: [email or web address]   URL: [email or web address]   Date: 02/20/2020   Prepared by: García Hernandez     Exercises  Supine Straight Leg Hip Adduction and Quad Set with Taiwo Given - 10 reps - 10 seconds hold - 1-2x daily  Supine Straight Leg Raises - 10 reps - 2 sets - 1-2x daily  Straight Leg Raise with External Rotation - 10 reps - 2 sets - 1-2x daily  Supine Bridge with Mini Swiss Ball Between Knees - 10 reps - 2 sets - 3 seconds hold - 1-2x daily  Clamshell with Resistance - 10 reps - 2 sets - 1-2x daily    Therapeutic Exercise and NMR EXR  [x] (66504) Provided verbal/tactile cueing for activities related to strengthening, flexibility, endurance, ROM for improvements in LE, proximal hip, and core control with self care, mobility, lifting, ambulation.  [] (03103) Provided verbal/tactile cueing for activities related to improving balance, coordination, kinesthetic sense, posture, motor skill, proprioception  to assist with LE, proximal hip, and core control in self care, mobility, lifting, ambulation and eccentric single leg control.      NMR and Therapeutic Activities:    [x] (13803 or 75035) Provided verbal/tactile cueing for activities related to improving balance, coordination, physician  [] Other    Prognosis for POC: [x] Good [] Fair  [] Poor      Patient requires continued skilled intervention: [x] Yes  [] No    ASSESSMENT:  Patient tolerated treatment well. Demonstrates poor VMO firing and atrophy. Hypermobility of patella, lateral tightness. Patient would benefit from skilled PT to increase strength and control of knee to reduce risk of recurrent patella dislocations and avoid surgical procedure. Treatment/Activity Tolerance:  [x] Patient tolerated treatment well [] Patient limited by fatigue  [] Patient limited by pain  [] Patient limited by other medical complications  [] Other:     Patient Requires Follow-up: [x] Yes  [] No    PLAN: See eval  [x] Continue per plan of care [] Alter current plan (see comments above)  [] Plan of care initiated [] Hold pending MD visit [] Discharge      Electronically signed by:  Mirian Platt PT , DPT [id number]    Note: If patient does not return for scheduled/ recommended follow up visits, this note will serve as a discharge from care along with most recent update on progress.

## 2020-03-11 ENCOUNTER — HOSPITAL ENCOUNTER (OUTPATIENT)
Dept: PHYSICAL THERAPY | Age: 40
Setting detail: THERAPIES SERIES
Discharge: HOME | End: 2020-03-11
Payer: COMMERCIAL

## 2020-03-11 NOTE — FLOWSHEET NOTE
[facility], [address]        Physical Therapy  Cancellation/No-show Note  Patient Name:  Ayleen Webb  :  1980   Date:  3/11/2020  Cancelled visits to date: 1  No-shows to date: 0    For today's appointment patient:  ? X Cancelled  ? Rescheduled appointment  ? No-show     Reason given by patient:  ? X Patient ill  ? Conflicting appointment  ? No transportation    ? Conflict with work  ? No reason given  ?   Other:     Comments:      Electronically signed by:  Jacqueline Santos PT

## 2020-03-18 ENCOUNTER — HOSPITAL ENCOUNTER (OUTPATIENT)
Dept: PHYSICAL THERAPY | Age: 40
Setting detail: THERAPIES SERIES
Discharge: HOME | End: 2020-03-18
Payer: COMMERCIAL

## 2020-03-25 ENCOUNTER — APPOINTMENT (OUTPATIENT)
Dept: PHYSICAL THERAPY | Age: 40
End: 2020-03-25
Payer: COMMERCIAL

## 2020-10-15 ENCOUNTER — OFFICE VISIT (OUTPATIENT)
Dept: ORTHOPEDIC SURGERY | Age: 40
End: 2020-10-15
Payer: COMMERCIAL

## 2020-10-15 VITALS — WEIGHT: 151.01 LBS | HEIGHT: 71 IN | BODY MASS INDEX: 21.14 KG/M2

## 2020-10-15 PROCEDURE — 1036F TOBACCO NON-USER: CPT | Performed by: ORTHOPAEDIC SURGERY

## 2020-10-15 PROCEDURE — G8427 DOCREV CUR MEDS BY ELIG CLIN: HCPCS | Performed by: ORTHOPAEDIC SURGERY

## 2020-10-15 PROCEDURE — G8484 FLU IMMUNIZE NO ADMIN: HCPCS | Performed by: ORTHOPAEDIC SURGERY

## 2020-10-15 PROCEDURE — 99213 OFFICE O/P EST LOW 20 MIN: CPT | Performed by: ORTHOPAEDIC SURGERY

## 2020-10-15 PROCEDURE — G8420 CALC BMI NORM PARAMETERS: HCPCS | Performed by: ORTHOPAEDIC SURGERY

## 2020-10-29 ENCOUNTER — OFFICE VISIT (OUTPATIENT)
Dept: ORTHOPEDIC SURGERY | Age: 40
End: 2020-10-29
Payer: COMMERCIAL

## 2020-10-29 VITALS — BODY MASS INDEX: 21.62 KG/M2 | HEIGHT: 70 IN | WEIGHT: 151 LBS

## 2020-10-29 PROBLEM — M22.2X2 PATELLOFEMORAL SYNDROME OF LEFT KNEE: Status: ACTIVE | Noted: 2020-10-29

## 2020-10-29 PROCEDURE — 99213 OFFICE O/P EST LOW 20 MIN: CPT | Performed by: ORTHOPAEDIC SURGERY

## 2020-10-29 PROCEDURE — G8484 FLU IMMUNIZE NO ADMIN: HCPCS | Performed by: ORTHOPAEDIC SURGERY

## 2020-10-29 PROCEDURE — G8420 CALC BMI NORM PARAMETERS: HCPCS | Performed by: ORTHOPAEDIC SURGERY

## 2020-10-29 PROCEDURE — 1036F TOBACCO NON-USER: CPT | Performed by: ORTHOPAEDIC SURGERY

## 2020-10-29 PROCEDURE — G8427 DOCREV CUR MEDS BY ELIG CLIN: HCPCS | Performed by: ORTHOPAEDIC SURGERY

## 2020-10-29 NOTE — PROGRESS NOTES
Chief Complaint:  Check-Up (Left Knee - MRI Review)      SUBJECTIVE:  Connie Morillo is a 47 y.o. female who returns today to review MRI results of the left knee, denies any further episodes of dislocation, endorses dull achy anterior pain especially going up and down stairs. Pain Assessment:  Pain Assessment  Location of Pain: Knee(Simultaneous filing. User may not have seen previous data.)  Location Modifiers: Left(Simultaneous filing. User may not have seen previous data.)  Severity of Pain: 0(Simultaneous filing. User may not have seen previous data.)  Relieving Factors: Rest  Result of Injury: No  Work-Related Injury: No  Are there other pain locations you wish to document?: No      Medical History:  Patient's medications, allergies, past medical, surgical, social and family histories were reviewed and updated as appropriate. Review of Systems:  Constitutional: negative  Respiratory: negative  Cardiovascular: negative  Musculoskeletal:negative except for Check-Up (Left Knee - MRI Review)    Relevant review of systems reviewed and available in the patient's chart in media tab    General Exam:   Constitutional: Patient is adequately groomed with no evidence of malnutrition  Mental Status: The patient is oriented to time, place and person. The patient's mood and affect are appropriate. Vascular: Examination reveals no swelling or calf tenderness. Peripheral pulses are palpable and 2+. OBJECTIVE:  Vital Signs:  Vitals:    10/29/20 1554   Weight: 151 lb (68.5 kg)   Height: 5' 10\" (1.778 m)       Appearance: alert, well appearing, and in no distress, oriented to person, place, and time and normal appearing weight. Physical exam:   Left knee shows no effusion, she does have bilateral lateral tilting of the patella, she has increased lateral translation of the left knee to 3 quadrants, no apprehension at today's visit. She tolerates full range of motion 0 to 130 degrees.   ACL, MCL, PCL and LCL

## 2021-01-25 ENCOUNTER — APPOINTMENT (OUTPATIENT)
Dept: GENERAL RADIOLOGY | Age: 41
End: 2021-01-25
Payer: COMMERCIAL

## 2021-01-25 ENCOUNTER — HOSPITAL ENCOUNTER (EMERGENCY)
Age: 41
Discharge: HOME | End: 2021-01-25
Attending: EMERGENCY MEDICINE
Payer: COMMERCIAL

## 2021-01-25 VITALS
OXYGEN SATURATION: 100 % | SYSTOLIC BLOOD PRESSURE: 109 MMHG | DIASTOLIC BLOOD PRESSURE: 74 MMHG | TEMPERATURE: 98.5 F | WEIGHT: 150 LBS | RESPIRATION RATE: 16 BRPM | BODY MASS INDEX: 21 KG/M2 | HEART RATE: 67 BPM | HEIGHT: 71 IN

## 2021-01-25 DIAGNOSIS — R07.9 CHEST PAIN, UNSPECIFIED TYPE: Primary | ICD-10-CM

## 2021-01-25 LAB
A/G RATIO: 1.6 (ref 1.1–2.2)
ALBUMIN SERPL-MCNC: 4.5 G/DL (ref 3.4–5)
ALP BLD-CCNC: 58 U/L (ref 40–129)
ALT SERPL-CCNC: 14 U/L (ref 10–40)
ANION GAP SERPL CALCULATED.3IONS-SCNC: 10 MMOL/L (ref 3–16)
AST SERPL-CCNC: 15 U/L (ref 15–37)
BASOPHILS ABSOLUTE: 0 K/UL (ref 0–0.2)
BASOPHILS RELATIVE PERCENT: 0.6 %
BILIRUB SERPL-MCNC: 0.4 MG/DL (ref 0–1)
BUN BLDV-MCNC: 13 MG/DL (ref 7–20)
CALCIUM SERPL-MCNC: 9.2 MG/DL (ref 8.3–10.6)
CHLORIDE BLD-SCNC: 104 MMOL/L (ref 99–110)
CO2: 25 MMOL/L (ref 21–32)
CREAT SERPL-MCNC: 0.6 MG/DL (ref 0.6–1.1)
D DIMER: <200 NG/ML DDU (ref 0–229)
EKG ATRIAL RATE: 79 BPM
EKG DIAGNOSIS: NORMAL
EKG P AXIS: 66 DEGREES
EKG P-R INTERVAL: 162 MS
EKG Q-T INTERVAL: 372 MS
EKG QRS DURATION: 82 MS
EKG QTC CALCULATION (BAZETT): 426 MS
EKG R AXIS: 104 DEGREES
EKG T AXIS: 76 DEGREES
EKG VENTRICULAR RATE: 79 BPM
EOSINOPHILS ABSOLUTE: 0.1 K/UL (ref 0–0.6)
EOSINOPHILS RELATIVE PERCENT: 1.8 %
GFR AFRICAN AMERICAN: >60
GFR NON-AFRICAN AMERICAN: >60
GLOBULIN: 2.8 G/DL
GLUCOSE BLD-MCNC: 97 MG/DL (ref 70–99)
HCT VFR BLD CALC: 41.1 % (ref 36–48)
HEMOGLOBIN: 13.8 G/DL (ref 12–16)
LYMPHOCYTES ABSOLUTE: 1.4 K/UL (ref 1–5.1)
LYMPHOCYTES RELATIVE PERCENT: 28 %
MCH RBC QN AUTO: 29.9 PG (ref 26–34)
MCHC RBC AUTO-ENTMCNC: 33.5 G/DL (ref 31–36)
MCV RBC AUTO: 89.2 FL (ref 80–100)
MONOCYTES ABSOLUTE: 0.3 K/UL (ref 0–1.3)
MONOCYTES RELATIVE PERCENT: 5.5 %
NEUTROPHILS ABSOLUTE: 3.3 K/UL (ref 1.7–7.7)
NEUTROPHILS RELATIVE PERCENT: 64.1 %
PDW BLD-RTO: 12.3 % (ref 12.4–15.4)
PLATELET # BLD: 170 K/UL (ref 135–450)
PMV BLD AUTO: 8.6 FL (ref 5–10.5)
POTASSIUM REFLEX MAGNESIUM: 3.7 MMOL/L (ref 3.5–5.1)
RBC # BLD: 4.6 M/UL (ref 4–5.2)
SODIUM BLD-SCNC: 139 MMOL/L (ref 136–145)
TOTAL PROTEIN: 7.3 G/DL (ref 6.4–8.2)
TROPONIN: <0.01 NG/ML
WBC # BLD: 5.2 K/UL (ref 4–11)

## 2021-01-25 PROCEDURE — 6370000000 HC RX 637 (ALT 250 FOR IP): Performed by: EMERGENCY MEDICINE

## 2021-01-25 PROCEDURE — 93005 ELECTROCARDIOGRAM TRACING: CPT | Performed by: EMERGENCY MEDICINE

## 2021-01-25 PROCEDURE — 93010 ELECTROCARDIOGRAM REPORT: CPT | Performed by: INTERNAL MEDICINE

## 2021-01-25 PROCEDURE — 99284 EMERGENCY DEPT VISIT MOD MDM: CPT

## 2021-01-25 PROCEDURE — 36415 COLL VENOUS BLD VENIPUNCTURE: CPT

## 2021-01-25 PROCEDURE — 71045 X-RAY EXAM CHEST 1 VIEW: CPT

## 2021-01-25 PROCEDURE — 80053 COMPREHEN METABOLIC PANEL: CPT

## 2021-01-25 PROCEDURE — 85379 FIBRIN DEGRADATION QUANT: CPT

## 2021-01-25 PROCEDURE — 85025 COMPLETE CBC W/AUTO DIFF WBC: CPT

## 2021-01-25 PROCEDURE — 84484 ASSAY OF TROPONIN QUANT: CPT

## 2021-01-25 RX ORDER — ACETAMINOPHEN 325 MG/1
650 TABLET ORAL ONCE
Status: COMPLETED | OUTPATIENT
Start: 2021-01-25 | End: 2021-01-25

## 2021-01-25 RX ADMIN — ACETAMINOPHEN 650 MG: 325 TABLET ORAL at 10:08

## 2021-01-25 ASSESSMENT — PAIN DESCRIPTION - PAIN TYPE
TYPE: ACUTE PAIN
TYPE: ACUTE PAIN

## 2021-01-25 ASSESSMENT — PAIN SCALES - GENERAL
PAINLEVEL_OUTOF10: 2
PAINLEVEL_OUTOF10: 2

## 2021-01-25 ASSESSMENT — PAIN DESCRIPTION - LOCATION
LOCATION: CHEST
LOCATION: CHEST
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
## 2021-04-21 ENCOUNTER — OFFICE VISIT (OUTPATIENT)
Dept: FAMILY MEDICINE CLINIC | Age: 41
End: 2021-04-21
Payer: COMMERCIAL

## 2021-04-21 ENCOUNTER — HOSPITAL ENCOUNTER (OUTPATIENT)
Dept: CT IMAGING | Age: 41
Discharge: HOME | End: 2021-04-21
Payer: COMMERCIAL

## 2021-04-21 VITALS
OXYGEN SATURATION: 99 % | DIASTOLIC BLOOD PRESSURE: 60 MMHG | HEART RATE: 69 BPM | WEIGHT: 160 LBS | SYSTOLIC BLOOD PRESSURE: 100 MMHG | HEIGHT: 71 IN | BODY MASS INDEX: 22.4 KG/M2

## 2021-04-21 DIAGNOSIS — R11.0 NAUSEA: ICD-10-CM

## 2021-04-21 DIAGNOSIS — G44.319 ACUTE POST-TRAUMATIC HEADACHE, NOT INTRACTABLE: ICD-10-CM

## 2021-04-21 DIAGNOSIS — G44.319 ACUTE POST-TRAUMATIC HEADACHE, NOT INTRACTABLE: Primary | ICD-10-CM

## 2021-04-21 DIAGNOSIS — H53.8 BLURRED VISION: ICD-10-CM

## 2021-04-21 PROCEDURE — 70450 CT HEAD/BRAIN W/O DYE: CPT

## 2021-04-21 PROCEDURE — 99214 OFFICE O/P EST MOD 30 MIN: CPT | Performed by: FAMILY MEDICINE

## 2021-04-21 ASSESSMENT — ENCOUNTER SYMPTOMS
NAUSEA: 1
VOMITING: 0
PHOTOPHOBIA: 1
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
## 2021-07-21 ENCOUNTER — TELEPHONE (OUTPATIENT)
Dept: FAMILY MEDICINE CLINIC | Age: 41
End: 2021-07-21

## 2021-07-21 ENCOUNTER — PATIENT MESSAGE (OUTPATIENT)
Dept: FAMILY MEDICINE CLINIC | Age: 41
End: 2021-07-21

## 2021-07-21 ENCOUNTER — NURSE TRIAGE (OUTPATIENT)
Dept: OTHER | Facility: CLINIC | Age: 41
End: 2021-07-21
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
## 2021-07-21 NOTE — TELEPHONE ENCOUNTER
Patient said she hasn't heard anything from cardiology on if she can get in earlier, appt is still set for 8/13.

## 2021-07-21 NOTE — TELEPHONE ENCOUNTER
----- Message from Adrián Lezama sent at 7/21/2021 12:05 PM EDT -----  Subject: Message to Provider    QUESTIONS  Information for Provider? Mayur Gonzales called back from the cardiologist's   office (see previous telephone encounter). She is just going to call the   pt directly to get this taken care of since the office is closed until 1  ---------------------------------------------------------------------------  --------------  [address]  What is the best way for the office to contact you? OK to leave message on   voicemail  Preferred Call Back Phone Number? [phone]  ---------------------------------------------------------------------------  --------------  SCRIPT ANSWERS  Relationship to Patient? Third Party  Representative Name?  Mayur Gonzales, cardiologist office

## 2021-07-21 NOTE — TELEPHONE ENCOUNTER
Spoke with cardiology they sent a message back to the MD to see if doctor would like to give patient a holter monitor before her next appointment. Will wait for them to respond and they will contact the patient directly. Patient is aware.

## 2021-07-21 NOTE — TELEPHONE ENCOUNTER
From: Wilian Torres  To: Negro Lemon MD  Sent: 7/21/2021 11:03 AM EDT  Subject: Non-Urgent Medical Question    Hi Dr. Julieta Guo,    I have an appointment with [facility] on August 13th (first available appointment).      Thank you,    -Tl Villareal

## 2021-07-21 NOTE — TELEPHONE ENCOUNTER
Called cardiology to see if we could get the patient into a sooner appointment for acute heart issues. Patient is leaving to go out of town on 7/23 to 7/30. They said they would send back a message and call us back to see if they could. Any time will work for her. Trying to get her in before she leaves today or tomorrow with anyone is fine. Will wait for them to call us back.

## 2021-07-21 NOTE — TELEPHONE ENCOUNTER
My recommendation would be for the patient to see her cardiologist.  Please call patient to find out if she contacted her cardiologist

## 2021-07-23 NOTE — TELEPHONE ENCOUNTER
48 hour holter monitor 8/2/21 at [facility], then the following week with the cardiologist at [facility]

## 2021-08-21 ENCOUNTER — APPOINTMENT (OUTPATIENT)
Dept: GENERAL RADIOLOGY | Age: 41
End: 2021-08-21
Payer: COMMERCIAL

## 2021-08-21 ENCOUNTER — HOSPITAL ENCOUNTER (EMERGENCY)
Age: 41
Discharge: HOME | End: 2021-08-21
Attending: EMERGENCY MEDICINE
Payer: COMMERCIAL

## 2021-08-21 VITALS
WEIGHT: 145 LBS | TEMPERATURE: 98.6 F | OXYGEN SATURATION: 100 % | DIASTOLIC BLOOD PRESSURE: 65 MMHG | SYSTOLIC BLOOD PRESSURE: 107 MMHG | RESPIRATION RATE: 18 BRPM | HEART RATE: 64 BPM | BODY MASS INDEX: 20.3 KG/M2 | HEIGHT: 71 IN

## 2021-08-21 DIAGNOSIS — J40 BRONCHITIS: Primary | ICD-10-CM

## 2021-08-21 PROCEDURE — U0005 INFEC AGEN DETEC AMPLI PROBE: HCPCS

## 2021-08-21 PROCEDURE — 99283 EMERGENCY DEPT VISIT LOW MDM: CPT

## 2021-08-21 PROCEDURE — 71045 X-RAY EXAM CHEST 1 VIEW: CPT

## 2021-08-21 PROCEDURE — U0003 INFECTIOUS AGENT DETECTION BY NUCLEIC ACID (DNA OR RNA); SEVERE ACUTE RESPIRATORY SYNDROME CORONAVIRUS 2 (SARS-COV-2) (CORONAVIRUS DISEASE [COVID-19]), AMPLIFIED PROBE TECHNIQUE, MAKING USE OF HIGH THROUGHPUT TECHNOLOGIES AS DESCRIBED BY CMS-2020-01-R: HCPCS

## 2021-08-21 RX ORDER — ALBUTEROL SULFATE 90 UG/1
2 AEROSOL, METERED RESPIRATORY (INHALATION) EVERY 4 HOURS PRN
Qty: 1 INHALER | Refills: 0 | Status: SHIPPED | OUTPATIENT
Start: 2021-08-21 | End: 2022-08-26

## 2021-08-21 RX ORDER — BENZONATATE 100 MG/1
100 CAPSULE ORAL 3 TIMES DAILY PRN
Qty: 20 CAPSULE | Refills: 0 | Status: SHIPPED | OUTPATIENT
Start: 2021-08-21 | End: 2021-08-28

## 2021-08-21 ASSESSMENT — PAIN DESCRIPTION - LOCATION: LOCATION: RIB CAGE

## 2021-08-21 ASSESSMENT — ENCOUNTER SYMPTOMS
EYE DISCHARGE: 0
NAUSEA: 0
BACK PAIN: 0
SHORTNESS OF BREATH: 0
SORE THROAT: 0
COUGH: 1
ABDOMINAL PAIN: 0
VOMITING: 0
DIARRHEA: 0

## 2021-08-21 ASSESSMENT — PAIN SCALES - GENERAL: PAINLEVEL_OUTOF10: 2

## 2021-08-21 ASSESSMENT — PAIN DESCRIPTION - PAIN TYPE: TYPE: ACUTE PAIN

## 2021-08-21 NOTE — ED NOTES
Pt discharge instructions, follow up and rx x 2 reviewed with pt. Pt verbalized understanding. No further needs. Pt discharged at this time.         Kera Garcia RN  08/21/21 [id number]

## 2021-08-21 NOTE — ED PROVIDER NOTES
discharge. Respiratory: Positive for cough. Negative for shortness of breath. Cardiovascular: Negative for chest pain. Gastrointestinal: Negative for abdominal pain, diarrhea, nausea and vomiting. Endocrine: Negative for polydipsia. Genitourinary: Negative for dysuria and urgency. Musculoskeletal: Negative for arthralgias, back pain and myalgias. Skin: Negative for rash. Neurological: Negative for weakness and headaches. Hematological: Does not bruise/bleed easily. Psychiatric/Behavioral: Negative for confusion. Positives and Pertinent negatives as per HPI. Except as noted above in the ROS, all other systems were reviewed and negative. PAST MEDICAL HISTORY     Past Medical History:   Diagnosis Date    Abnormal Pap smear of cervix     ,x1 cryosurgery,then normal    Glaucoma     H/O varicella     childhood    Primary open angle glaucoma     Dr. Jaja Wang PSVT (paroxysmal supraventricular tachycardia) [facility] 10/9/2019         SURGICAL HISTORY     Past Surgical History:   Procedure Laterality Date    WISDOM TOOTH EXTRACTION  in 2006         CURRENTMEDICATIONS       Discharge Medication List as of 8/21/2021 12:13 PM      CONTINUE these medications which have NOT CHANGED    Details   metoprolol succinate (TOPROL XL) 50 MG extended release tablet Take 50 mg by mouth dailyHistorical Med      Latanoprost (XALATAN OP) Apply  to eye.       Dorzolamide-Timolol (COSOPT OP) Apply  to eye.      timolol (TIMOPTIC) 0.5 % ophthalmic solution Historical Med               ALLERGIES     Tobacco [tobacco]    FAMILYHISTORY       Family History   Problem Relation Age of Onset    Cancer Mother 39        breast    Heart Disease Father     Arthritis Maternal Grandmother     High Blood Pressure Maternal Grandmother     High Cholesterol Maternal Grandmother     Stroke Maternal Grandmother     High Blood Pressure Maternal Grandfather     High Cholesterol Maternal Grandfather     Cancer Paternal Grandmother           SOCIAL HISTORY       Social History     Socioeconomic History    Marital status:      Spouse name: None    Number of children: None    Years of education: None    Highest education level: None   Occupational History    Occupation:  transport [place]   Tobacco Use    Smoking status: Never Smoker    Smokeless tobacco: Never Used   Vaping Use    Vaping Use: Never used   Substance and Sexual Activity    Alcohol use: Yes     Comment: occasional    Drug use: No    Sexual activity: Yes     Partners: Male   Other Topics Concern    None   Social History Narrative    Not exercising regularly, diet healthy     Social Determinants of Health     Financial Resource Strain:     Difficulty of Paying Living Expenses:    Food Insecurity:     Worried About Running Out of Food in the Last Year:     [address] in the Last Year:    Transportation Needs:     Lack of Transportation (Medical):      Lack of Transportation (Non-Medical):    Physical Activity:     Days of Exercise per Week:     Minutes of Exercise per Session:    Stress:     Feeling of Stress :    Social Connections:     Frequency of Communication with Friends and Family:     Frequency of Social Gatherings with Friends and Family:     Attends Confucianism Services:     Active Member of Clubs or Organizations:     Attends Club or Organization Meetings:     Marital Status:    Intimate Partner Violence:     Fear of Current or Ex-Partner:     Emotionally Abused:     Physically Abused:     Sexually Abused:        SCREENINGS    Demetrio Coma Scale  Eye Opening: Spontaneous  Best Verbal Response: Oriented  Best Motor Response: Obeys commands  Demetrio Coma Scale Score: 15        PHYSICAL EXAM    (up to 7 for level 4, 8 or more for level 5)     ED Triage Vitals [08/21/21 1051]   BP Temp Temp Source Pulse Resp SpO2 Height Weight   (!) 147/82 98.6 °F (37 °C) Oral 72 18 100 % 5' 11\" (1.803 m) 145 lb (65.8 kg)       Physical Exam  Constitutional:       General: She is not in acute distress. Appearance: She is well-developed. HENT:      Head: Normocephalic and atraumatic. Right Ear: External ear normal.      Left Ear: External ear normal.      Nose:      Comments: Nose and oropharynx exams deferred secondary to concern for Covid. Eyes:      Conjunctiva/sclera: Conjunctivae normal.   Cardiovascular:      Rate and Rhythm: Normal rate and regular rhythm. Heart sounds: Normal heart sounds. No murmur heard. No friction rub. No gallop. Pulmonary:      Effort: Pulmonary effort is normal. No respiratory distress. Breath sounds: Normal breath sounds. No stridor. No wheezing, rhonchi or rales. Abdominal:      General: Bowel sounds are normal. There is no distension. Palpations: Abdomen is soft. Tenderness: There is no abdominal tenderness. There is no guarding or rebound. Musculoskeletal:         General: No tenderness. Normal range of motion. Cervical back: Normal range of motion and neck supple. Lymphadenopathy:      Cervical: No cervical adenopathy. Skin:     General: Skin is warm and dry. Capillary Refill: Capillary refill takes less than 2 seconds. Findings: No rash. Neurological:      Mental Status: She is alert and oriented to person, place, and time. Gait: Gait normal.   Psychiatric:         Mood and Affect: Mood normal.         DIAGNOSTIC RESULTS   LABS:    No results found for this visit on 08/21/21. All other labs were within normal range ornot returned as of this dictation. EKG: All EKG's are interpreted by the Emergency Department Physician who either signs or Co-signs this chart in the absence of a cardiologist.  Please see their note for interpretation of EKG.     RADIOLOGY:   Non-plain film images such as CT, Ultrasound and MRI are read by the radiologist.  Plain radiographic images are visualized and preliminarily interpreted by the ED Provider with the belowfindings:    Interpretation per the Radiologist below, if available at the time of this note:    XR CHEST PORTABLE   Final Result   No radiographic evidence of acute pulmonary disease. PROCEDURES   Unless otherwise noted below, none     Procedures    CRITICAL CARE TIME   N/A    CONSULTS:  None      EMERGENCY DEPARTMENT COURSE and DIFFERENTIAL DIAGNOSIS/MDM:   Vitals:    Vitals:    08/21/21 1051 08/21/21 1212   BP: (!) 147/82 107/65   Pulse: 72 64   Resp: 18 18   Temp: 98.6 °F (37 °C)    TempSrc: Oral    SpO2: 100% 100%   Weight: 145 lb (65.8 kg)    Height: 5' 11\" (1.803 m)        Patient was given the following medications:  Medications - No data to display    Patient is an otherwise healthy 68-year-old female who presents to the emergency department with a cough for the last week to 10 days. Numerous people around her have had negative coronavirus test and she is vaccinated. The likelihood of her having coronavirus I believe is low. Patient likely has viral bronchitis. Imaging was obtained that is unremarkable. Covid test is pending. At this point I am comfortable discharging the patient home with Tessalon Perles and albuterol inhaler as they have been improving her symptoms at home. Patient was instructed to follow-up with her primary care provider. She is agreeable with this plan. The patient is low risk for mortality based on demographic, history and clinical factors. Specific COVID19 testing is not pending in this patient. Given the best available information and clinical assessment, I estimate the risk of hospitalization to be greater than the risk of treatment at home. I have explained to the patient that the risk could rapidly change, given precautions for return and instructions on how to minimize being contagious.     Differential diagnosis included but was not limited to: acute coronary syndrome, pulmonary embolism, COPD/asthma, pneumonia, sepsis, pericardial tamponade, pneumothorax, CHF, thoracic aortic dissection, anxiety, viral/strep pharyngitis, PTA, RPA, Mikel's angina, mononucleosis. The patient understands the importance of follow up and reasons to return. FINAL IMPRESSION      1. Bronchitis          DISPOSITION/PLAN   DISPOSITION Decision To Discharge 08/21/2021 12:11:29 PM      PATIENT REFERRED TO:  Anthony Francisco MD  [address] [id number] [address]  [phone]    Schedule an appointment as soon as possible for a visit       [place] [id number].  [facility] Emergency Department  Janine KUN Cruz [address]  [phone]  Go to   If symptoms worsen      DISCHARGE MEDICATIONS:  Discharge Medication List as of 8/21/2021 12:13 PM      START taking these medications    Details   albuterol sulfate  (90 Base) MCG/ACT inhaler Inhale 2 puffs into the lungs every 4 hours as needed for Wheezing, Disp-1 Inhaler, R-0Normal      benzonatate (TESSALON) 100 MG capsule Take 1 capsule by mouth 3 times daily as needed for Cough, Disp-20 capsule, R-0Normal             DISCONTINUED MEDICATIONS:  Discharge Medication List as of 8/21/2021 12:13 PM                 (Please note that portions of this note were completed with a voice recognition program.  Efforts were made to edit the dictations but occasionally words are mis-transcribed.)    Princess Daphney MD(electronically signed)              Princess Daphney MD  08/21/21 1552

## 2021-08-22 LAB — SARS-COV-2: NOT DETECTED

## 2021-10-13 ENCOUNTER — OFFICE VISIT (OUTPATIENT)
Dept: FAMILY MEDICINE CLINIC | Age: 41
End: 2021-10-13
Payer: COMMERCIAL

## 2021-10-13 VITALS
HEIGHT: 71 IN | HEART RATE: 70 BPM | DIASTOLIC BLOOD PRESSURE: 58 MMHG | OXYGEN SATURATION: 99 % | WEIGHT: 155.4 LBS | BODY MASS INDEX: 21.76 KG/M2 | SYSTOLIC BLOOD PRESSURE: 116 MMHG

## 2021-10-13 DIAGNOSIS — Z23 NEEDS FLU SHOT: ICD-10-CM

## 2021-10-13 DIAGNOSIS — J45.40 MODERATE PERSISTENT ASTHMA, UNSPECIFIED WHETHER COMPLICATED: Primary | ICD-10-CM

## 2021-10-13 DIAGNOSIS — Z91.018 FOOD ALLERGY: ICD-10-CM

## 2021-10-13 PROCEDURE — 99213 OFFICE O/P EST LOW 20 MIN: CPT | Performed by: FAMILY MEDICINE

## 2021-10-13 PROCEDURE — 90471 IMMUNIZATION ADMIN: CPT | Performed by: FAMILY MEDICINE

## 2021-10-13 PROCEDURE — 90686 IIV4 VACC NO PRSV 0.5 ML IM: CPT | Performed by: FAMILY MEDICINE

## 2021-10-13 RX ORDER — EPINEPHRINE 0.3 MG/.3ML
0.3 INJECTION SUBCUTANEOUS ONCE
Qty: 0.3 ML | Refills: 1 | Status: SHIPPED | OUTPATIENT
Start: 2021-10-13 | End: 2022-08-26

## 2021-10-13 ASSESSMENT — PATIENT HEALTH QUESTIONNAIRE - PHQ9
2. FEELING DOWN, DEPRESSED OR HOPELESS: 0
SUM OF ALL RESPONSES TO PHQ QUESTIONS 1-9: 0
SUM OF ALL RESPONSES TO PHQ QUESTIONS 1-9: 0
SUM OF ALL RESPONSES TO PHQ9 QUESTIONS 1 & 2: 0
SUM OF ALL RESPONSES TO PHQ QUESTIONS 1-9: 0
1. LITTLE INTEREST OR PLEASURE IN DOING THINGS: 0

## 2021-10-13 ASSESSMENT — ENCOUNTER SYMPTOMS
WHEEZING: 1
COUGH: 1

## 2021-10-13 NOTE — PROGRESS NOTES
Vaccine Information Sheet, \"Influenza - Inactivated\"  given to Ara Medina, or parent/legal guardian of  Ara Medina and verbalized understanding. Patient responses:    Have you ever had a reaction to a flu vaccine? No  Do you have any current illness? No  Have you ever had Guillian Colfax Syndrome? No  Do you have a serious allergy to any of the follow: Neomycin, Polymyxin, Thimerosal, eggs or egg products? No    Flu vaccine given per order. Please see immunization tab. Risks and benefits explained. Current VIS given.

## 2021-10-13 NOTE — PROGRESS NOTES
Patient:  Brian March a 46 y.o. femalewho presents today with the following Chief Complaint(s):  Chief Complaint   Patient presents with    Allergic Reaction     pt states that she was eating cashews a month ago and then 3 weeks ago she had an almond. she states that her tongue got very itchy and her throat started to swell. she took some benadryl and it helped with the swelling but then she had vomiting and diarrhea later that night. Patient has developed an allergy to cashews and almonds. She states that she had a handful of cashews and very quickly had swelling of her tongue and mouth. Benadryl helps calm that down. Later she had a very small taste of a chocolate covered Anthony. Again she had swelling of her mouth. She has had allergic reactions to tomato plants but she is able to eat tomatoes. 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 Soybean dust has caused wheezing and chest congestion. Current Outpatient Medications   Medication Sig Dispense Refill    EPINEPHrine (EPIPEN 2-SHANELLE) 0.3 MG/0.3ML SOAJ injection Inject 0.3 mLs into the skin once for 1 dose Use as directed for allergic reaction 0.3 mL 1    mometasone-formoterol (DULERA) 100-5 MCG/ACT inhaler Inhale 2 puffs into the lungs 2 times daily 1 each 5    timolol (TIMOPTIC) 0.5 % ophthalmic solution       metoprolol succinate (TOPROL XL) 50 MG extended release tablet Take 50 mg by mouth daily      Latanoprost (XALATAN OP) Apply  to eye.  albuterol sulfate  (90 Base) MCG/ACT inhaler Inhale 2 puffs into the lungs every 4 hours as needed for Wheezing 1 Inhaler 0     No current facility-administered medications for this visit. Patients past medical history, surgical history, family history, medications and allergies were all reviewed and updated as appropriate today. Review of Systems   HENT: Positive for congestion. Respiratory: Positive for cough and wheezing.          Physical Exam  Vitals reviewed. Constitutional:       General: She is not in acute distress. Cardiovascular:      Rate and Rhythm: Normal rate and regular rhythm. Heart sounds: Normal heart sounds. Pulmonary:      Effort: No respiratory distress. Breath sounds: No wheezing, rhonchi or rales. Neurological:      Mental Status: She is alert. Vitals:    10/13/21 1307   BP: (!) 116/58   Pulse: 70   SpO2: 99%   Weight: 155 lb 6.4 oz (70.5 kg)   Height: 5' 11\" (1.803 m)       Assessment/Plan:   Susana England was seen today for allergic reaction. Diagnoses and all orders for this visit:    Moderate persistent asthma, unspecified whether complicated  -     mometasone-formoterol (DULERA) 100-5 MCG/ACT inhaler; Inhale 2 puffs into the lungs 2 times daily    Food allergy  -     EPINEPHrine (EPIPEN 2-SHANELLE) 0.3 MG/0.3ML SOAJ injection; Inject 0.3 mLs into the skin once for 1 dose Use as directed for allergic reaction    Needs flu shot  -     INFLUENZA, QUADV, 3 YRS AND OLDER, IM PF, PREFILL SYR OR SDV, 0.5ML (AFLURIA QUADV, PF)      Patient was advised to see an allergist for formal food allergy testing. She was given the name of  or [place]. She will check with her insurance plan to see who she can choose from. If she needs referral she will let us know. We will start her on medication for asthma. She also was given an EpiPen prescription.   She was told that if she ever uses the EpiPen she still needs to go to the emergency room

## 2022-06-22 ENCOUNTER — HOSPITAL ENCOUNTER (OUTPATIENT)
Age: 42
Discharge: HOME | End: 2022-06-22
Payer: COMMERCIAL

## 2022-06-22 DIAGNOSIS — R19.7 DIARRHEA, UNSPECIFIED TYPE: Primary | ICD-10-CM

## 2022-06-22 DIAGNOSIS — R19.7 DIARRHEA, UNSPECIFIED TYPE: ICD-10-CM

## 2022-06-22 PROCEDURE — 36415 COLL VENOUS BLD VENIPUNCTURE: CPT

## 2022-06-22 PROCEDURE — 83516 IMMUNOASSAY NONANTIBODY: CPT

## 2022-06-22 PROCEDURE — 86140 C-REACTIVE PROTEIN: CPT

## 2022-06-22 PROCEDURE — 82784 ASSAY IGA/IGD/IGG/IGM EACH: CPT

## 2022-06-23 ENCOUNTER — HOSPITAL ENCOUNTER (OUTPATIENT)
Age: 42
Setting detail: SPECIMEN
Discharge: HOME | End: 2022-06-23
Payer: COMMERCIAL

## 2022-06-23 LAB
C-REACTIVE PROTEIN: <3 MG/L (ref 0–5.1)
IGA: 222 MG/DL (ref 70–400)
TISSUE TRANSGLUTAMINASE IGA: <0.5 U/ML (ref 0–14)

## 2022-06-23 PROCEDURE — 87329 GIARDIA AG IA: CPT

## 2022-06-23 PROCEDURE — 87336 ENTAMOEB HIST DISPR AG IA: CPT

## 2022-06-23 PROCEDURE — 83993 ASSAY FOR CALPROTECTIN FECAL: CPT

## 2022-06-23 PROCEDURE — 82653 EL-1 FECAL QUANTITATIVE: CPT

## 2022-06-23 PROCEDURE — 82705 FATS/LIPIDS FECES QUAL: CPT

## 2022-06-23 PROCEDURE — 87328 CRYPTOSPORIDIUM AG IA: CPT

## 2022-06-24 LAB
CRYPTOSPORIDIUM ANTIGEN STOOL: NORMAL
E HISTOLYTICA ANTIGEN STOOL: NORMAL
GIARDIA ANTIGEN STOOL: NORMAL

## 2022-06-25 LAB
FECAL NEUTRAL FAT: NORMAL
FECAL SPLIT FATS: NORMAL

## 2022-06-26 LAB — CALPROTECTIN, FECAL: 12 UG/G

## 2022-06-28 LAB — PANCREATIC ELASTASE, FECAL: >800 UG/G

## 2022-08-26 ENCOUNTER — TELEMEDICINE (OUTPATIENT)
Dept: FAMILY MEDICINE CLINIC | Age: 42
End: 2022-08-26
Payer: COMMERCIAL

## 2022-08-26 DIAGNOSIS — J45.40 MODERATE PERSISTENT ASTHMA, UNSPECIFIED WHETHER COMPLICATED: ICD-10-CM

## 2022-08-26 DIAGNOSIS — U07.1 COVID: Primary | ICD-10-CM

## 2022-08-26 PROCEDURE — 99213 OFFICE O/P EST LOW 20 MIN: CPT | Performed by: FAMILY MEDICINE

## 2022-08-26 RX ORDER — NIRMATRELVIR AND RITONAVIR 300-100 MG
KIT ORAL
Qty: 30 TABLET | Refills: 0 | Status: SHIPPED | OUTPATIENT
Start: 2022-08-26 | End: 2022-08-31

## 2022-08-26 ASSESSMENT — ENCOUNTER SYMPTOMS
COUGH: 1
WHEEZING: 0
RHINORRHEA: 1
SHORTNESS OF BREATH: 0

## 2022-08-26 NOTE — PROGRESS NOTES
Faheem Tran (:  1980) is a Established patient, here for evaluation of the following:    Assessment & Plan   Below is the assessment and plan developed based on review of pertinent history, physical exam, labs, studies, and medications. 1. COVID, talked with patient about advantages and disadvantages of taking the antiviral.  We talked about side effects of nausea and vomiting and rebound symptoms. With patient's history of asthma we will go ahead and try the medication. If she has issues she will stop the medicine. Otherwise she will take Mucinex, increase fluids and rest.  She will start her Dulera and use her albuterol as needed. -     nirmatrelvir/ritonavir (PAXLOVID, 300/100,) 20 x 150 MG & 10 x 100MG TBPK; Take 3 tablets (two 150 mg nirmatrelvir and one 100 mg ritonavir tablets) by mouth every 12 hours for 5 days. , Disp-30 tablet, R-0Normal  2. Moderate persistent asthma, unspecified whether complicated  -     mometasone-formoterol (DULERA) 100-5 MCG/ACT inhaler; Inhale 2 puffs into the lungs 2 times daily, Disp-1 each, R-5Normal    Return if symptoms worsen or fail to improve. Subjective   Patient was seen virtually. She was at her home I was at my office. She says yesterday she was feeling a little off and then this morning she woke up with body aches low-grade fever of 99, chills cough runny nose. Yesterday her COVID test was negative. Today her COVID test was positive. She does have a history of asthma. At this time she is not wheezy or feeling any chest tightness or pressure. She just has a slight cough. But her past history she says that anytime that she would get a runny nose it would turn into a bronchitis. Review of Systems   Constitutional:  Positive for chills, fatigue and fever. HENT:  Positive for postnasal drip and rhinorrhea. Respiratory:  Positive for cough. Negative for shortness of breath and wheezing. Musculoskeletal:  Positive for myalgias. Objective   Patient-Reported Vitals  Patient-Reported Weight: 150 lb  Patient-Reported Height: 5'11  Patient-Reported Pulse Oximetry: 96       Physical Exam  [INSTRUCTIONS:  \"[x]\" Indicates a positive item  \"[]\" Indicates a negative item  -- DELETE ALL ITEMS NOT EXAMINED]    Constitutional: [x] Appears well-developed and well-nourished [x] No apparent distress      [] Abnormal -     Mental status: [x] Alert and awake  [x] Oriented to person/place/time [x] Able to follow commands    [] Abnormal -     Eyes:   EOM    [x]  Normal    [] Abnormal -   Sclera  [x]  Normal    [] Abnormal -          Discharge [x]  None visible   [] Abnormal -     HENT: [x] Normocephalic, atraumatic  [] Abnormal -   [] Mouth/Throat: Mucous membranes are moist    External Ears [x] Normal  [] Abnormal -    Neck: [x] No visualized mass [] Abnormal -     Pulmonary/Chest: [x] Respiratory effort normal   [x] No visualized signs of difficulty breathing or respiratory distress        [] Abnormal -      Musculoskeletal:   [x] Normal gait with no signs of ataxia         [x] Normal range of motion of neck        [] Abnormal -     Neurological:        [x] No Facial Asymmetry (Cranial nerve 7 motor function) (limited exam due to video visit)          [x] No gaze palsy        [] Abnormal -          Skin:        [x] No significant exanthematous lesions or discoloration noted on facial skin         [] Abnormal -            Psychiatric:       [x] Normal Affect [] Abnormal -        [x] No Hallucinations    Other pertinent observable physical exam findings:-           Bea Patel, was evaluated through a synchronous (real-time) audio-video encounter. The patient (or guardian if applicable) is aware that this is a billable service, which includes applicable co-pays. This Virtual Visit was conducted with patient's (and/or legal guardian's) consent.  The visit was conducted pursuant to the emergency declaration under the [address] and the

## 2022-12-07 ENCOUNTER — OFFICE VISIT (OUTPATIENT)
Dept: FAMILY MEDICINE CLINIC | Age: 42
End: 2022-12-07
Payer: COMMERCIAL

## 2022-12-07 VITALS
SYSTOLIC BLOOD PRESSURE: 110 MMHG | HEART RATE: 89 BPM | HEIGHT: 71 IN | WEIGHT: 148.4 LBS | OXYGEN SATURATION: 99 % | BODY MASS INDEX: 20.77 KG/M2 | DIASTOLIC BLOOD PRESSURE: 68 MMHG

## 2022-12-07 DIAGNOSIS — L65.9 HAIR LOSS: Primary | ICD-10-CM

## 2022-12-07 PROCEDURE — 36415 COLL VENOUS BLD VENIPUNCTURE: CPT | Performed by: FAMILY MEDICINE

## 2022-12-07 PROCEDURE — 99213 OFFICE O/P EST LOW 20 MIN: CPT | Performed by: FAMILY MEDICINE

## 2022-12-07 ASSESSMENT — PATIENT HEALTH QUESTIONNAIRE - PHQ9
3. TROUBLE FALLING OR STAYING ASLEEP: 0
8. MOVING OR SPEAKING SO SLOWLY THAT OTHER PEOPLE COULD HAVE NOTICED. OR THE OPPOSITE, BEING SO FIGETY OR RESTLESS THAT YOU HAVE BEEN MOVING AROUND A LOT MORE THAN USUAL: 0
SUM OF ALL RESPONSES TO PHQ QUESTIONS 1-9: 0
SUM OF ALL RESPONSES TO PHQ QUESTIONS 1-9: 0
2. FEELING DOWN, DEPRESSED OR HOPELESS: 0
SUM OF ALL RESPONSES TO PHQ QUESTIONS 1-9: 0
4. FEELING TIRED OR HAVING LITTLE ENERGY: 0
9. THOUGHTS THAT YOU WOULD BE BETTER OFF DEAD, OR OF HURTING YOURSELF: 0
10. IF YOU CHECKED OFF ANY PROBLEMS, HOW DIFFICULT HAVE THESE PROBLEMS MADE IT FOR YOU TO DO YOUR WORK, TAKE CARE OF THINGS AT HOME, OR GET ALONG WITH OTHER PEOPLE: 0
SUM OF ALL RESPONSES TO PHQ9 QUESTIONS 1 & 2: 0
1. LITTLE INTEREST OR PLEASURE IN DOING THINGS: 0
6. FEELING BAD ABOUT YOURSELF - OR THAT YOU ARE A FAILURE OR HAVE LET YOURSELF OR YOUR FAMILY DOWN: 0
SUM OF ALL RESPONSES TO PHQ QUESTIONS 1-9: 0
5. POOR APPETITE OR OVEREATING: 0
7. TROUBLE CONCENTRATING ON THINGS, SUCH AS READING THE NEWSPAPER OR WATCHING TELEVISION: 0

## 2022-12-07 NOTE — PROGRESS NOTES
Patient:  Alcides Allen a 38 y.o. Leon Gómezr presents today with the following Chief Complaint(s):  Chief Complaint   Patient presents with    Alopecia     Pt states that since October, she has been rapidly losing hair. She sates that she was seen by Dr. Allie Sutherland and she was told that she has a dairy allergy, so she has stopped all dairy and wants to have bw done to make sure everything is going ok. Patient says that every October she tends to have more hair loss. She has noticed this over the years. However this year the hair loss has not slowed down and has persisted. She says her hairdresser is also noticed that her hair has been more thin. Patient is now on a dairy free diet due to a dairy allergy. States that she has had repeated recent viral infections. She had COVID several months ago and then her children continue to bring home other illnesses. She denies hair loss in a discrete pattern. Denies hair loss of her eyebrows or her body hair        Current Outpatient Medications   Medication Sig Dispense Refill    mometasone-formoterol (DULERA) 100-5 MCG/ACT inhaler Inhale 2 puffs into the lungs 2 times daily 1 each 5    timolol (TIMOPTIC) 0.5 % ophthalmic solution       metoprolol succinate (TOPROL XL) 50 MG extended release tablet Take 50 mg by mouth daily      Latanoprost (XALATAN OP) Apply  to eye. No current facility-administered medications for this visit. Patients past medical history, surgical history, family history, medications and allergies were all reviewed and updated as appropriate today. Review of Systems   Constitutional:  Negative for fever. Skin:  Negative for rash. Physical Exam  Vitals reviewed. Cardiovascular:      Heart sounds: Normal heart sounds. Pulmonary:      Breath sounds: Normal breath sounds. Skin:     Comments: Patient does have some bitemporal thinning.   There is no discrete circular areas there is no erythema or inflammation of the scalp itself. There is no scaling or sign of any dermatitis of her scalp   Neurological:      Mental Status: She is alert. Vitals:    12/07/22 1342   BP: 110/68   Pulse: 89   SpO2: 99%   Weight: 148 lb 6.4 oz (67.3 kg)   Height: 5' 11\" (1.803 m)       Assessment/Plan:   Priscilla Dominguez was seen today for alopecia. Diagnoses and all orders for this visit:    Hair loss  -     Comprehensive Metabolic Panel  -     TSH  -     CBC with Auto Differential      We discussed that stress is the #1 cause of hair loss. Her recent repeated viral illnesses most likely are the source. It is going to take some time for the hair loss to slow down and to regenerate. She does have a dermatologist.  I recommended that she call now to get an appointment because it will take several months to get that appointment.   We will do some lab work to rule out other issues

## 2022-12-08 LAB
A/G RATIO: 2 (ref 1.1–2.2)
ALBUMIN SERPL-MCNC: 4.3 G/DL (ref 3.4–5)
ALP BLD-CCNC: 55 U/L (ref 40–129)
ALT SERPL-CCNC: 17 U/L (ref 10–40)
ANION GAP SERPL CALCULATED.3IONS-SCNC: 13 MMOL/L (ref 3–16)
AST SERPL-CCNC: 13 U/L (ref 15–37)
BASOPHILS ABSOLUTE: 0 K/UL (ref 0–0.2)
BASOPHILS RELATIVE PERCENT: 0.4 %
BILIRUB SERPL-MCNC: 0.6 MG/DL (ref 0–1)
BUN BLDV-MCNC: 8 MG/DL (ref 7–20)
CALCIUM SERPL-MCNC: 9.4 MG/DL (ref 8.3–10.6)
CHLORIDE BLD-SCNC: 99 MMOL/L (ref 99–110)
CO2: 24 MMOL/L (ref 21–32)
CREAT SERPL-MCNC: 0.7 MG/DL (ref 0.6–1.1)
EOSINOPHILS ABSOLUTE: 0.1 K/UL (ref 0–0.6)
EOSINOPHILS RELATIVE PERCENT: 0.6 %
GFR SERPL CREATININE-BSD FRML MDRD: >60 ML/MIN/{1.73_M2}
GLUCOSE BLD-MCNC: 83 MG/DL (ref 70–99)
HCT VFR BLD CALC: 41.9 % (ref 36–48)
HEMOGLOBIN: 13.5 G/DL (ref 12–16)
LYMPHOCYTES ABSOLUTE: 1.8 K/UL (ref 1–5.1)
LYMPHOCYTES RELATIVE PERCENT: 18.8 %
MCH RBC QN AUTO: 30 PG (ref 26–34)
MCHC RBC AUTO-ENTMCNC: 32.2 G/DL (ref 31–36)
MCV RBC AUTO: 93.2 FL (ref 80–100)
MONOCYTES ABSOLUTE: 0.4 K/UL (ref 0–1.3)
MONOCYTES RELATIVE PERCENT: 4.4 %
NEUTROPHILS ABSOLUTE: 7.3 K/UL (ref 1.7–7.7)
NEUTROPHILS RELATIVE PERCENT: 75.8 %
PDW BLD-RTO: 12.6 % (ref 12.4–15.4)
PLATELET # BLD: 145 K/UL (ref 135–450)
PMV BLD AUTO: 9.7 FL (ref 5–10.5)
POTASSIUM SERPL-SCNC: 3.6 MMOL/L (ref 3.5–5.1)
RBC # BLD: 4.5 M/UL (ref 4–5.2)
SODIUM BLD-SCNC: 136 MMOL/L (ref 136–145)
TOTAL PROTEIN: 6.5 G/DL (ref 6.4–8.2)
TSH SERPL DL<=0.05 MIU/L-ACNC: 0.99 UIU/ML (ref 0.27–4.2)
WBC # BLD: 9.6 K/UL (ref 4–11)

## 2023-03-05 ENCOUNTER — HOSPITAL ENCOUNTER (EMERGENCY)
Age: 43
Discharge: HOME | End: 2023-03-05
Attending: EMERGENCY MEDICINE
Payer: COMMERCIAL

## 2023-03-05 ENCOUNTER — APPOINTMENT (OUTPATIENT)
Dept: GENERAL RADIOLOGY | Age: 43
End: 2023-03-05
Payer: COMMERCIAL

## 2023-03-05 VITALS
BODY MASS INDEX: 20.02 KG/M2 | SYSTOLIC BLOOD PRESSURE: 131 MMHG | OXYGEN SATURATION: 100 % | DIASTOLIC BLOOD PRESSURE: 74 MMHG | RESPIRATION RATE: 16 BRPM | TEMPERATURE: 98.7 F | HEIGHT: 71 IN | WEIGHT: 143 LBS | HEART RATE: 87 BPM

## 2023-03-05 DIAGNOSIS — Z87.09 HISTORY OF ASTHMA: ICD-10-CM

## 2023-03-05 DIAGNOSIS — J18.9 ATYPICAL PNEUMONIA: Primary | ICD-10-CM

## 2023-03-05 LAB
RAPID INFLUENZA  B AGN: NEGATIVE
RAPID INFLUENZA A AGN: NEGATIVE
SARS-COV-2, NAAT: NOT DETECTED

## 2023-03-05 PROCEDURE — 87804 INFLUENZA ASSAY W/OPTIC: CPT

## 2023-03-05 PROCEDURE — 71046 X-RAY EXAM CHEST 2 VIEWS: CPT

## 2023-03-05 PROCEDURE — 99284 EMERGENCY DEPT VISIT MOD MDM: CPT

## 2023-03-05 PROCEDURE — 87635 SARS-COV-2 COVID-19 AMP PRB: CPT

## 2023-03-05 RX ORDER — AZITHROMYCIN 250 MG/1
250 TABLET, FILM COATED ORAL SEE ADMIN INSTRUCTIONS
Qty: 6 TABLET | Refills: 0 | Status: SHIPPED | OUTPATIENT
Start: 2023-03-05 | End: 2023-03-10

## 2023-03-05 RX ORDER — PREDNISONE 50 MG/1
50 TABLET ORAL DAILY
Qty: 5 TABLET | Refills: 0 | Status: SHIPPED | OUTPATIENT
Start: 2023-03-05 | End: 2023-03-10

## 2023-03-05 RX ORDER — ALBUTEROL SULFATE 0.63 MG/3ML
1 SOLUTION RESPIRATORY (INHALATION) EVERY 6 HOURS PRN
COMMUNITY

## 2023-03-05 ASSESSMENT — PAIN - FUNCTIONAL ASSESSMENT
PAIN_FUNCTIONAL_ASSESSMENT: NONE - DENIES PAIN
PAIN_FUNCTIONAL_ASSESSMENT: NONE - DENIES PAIN

## 2023-03-05 NOTE — ED PROVIDER NOTES
[facility] EMERGENCY DEPARTMENT      CHIEF COMPLAINT  Cough (Pt states she was sick 3 weeks ago, was seen at [facility] 2 weeks ago was treated for sinus infection. Pt states now has increased cough, body aches and fever. Pt states COVID test negative X4)       HISTORY OF PRESENT ILLNESS  Marcial Valiente is a 41 y.o. female  who presents to the ED complaining of cough and malaise. Symptoms started yesterday that were worse with fever, productive cough with yellow sputum and just feeling fatigued and achy. No vomiting or diarrhea. She states that she started with sinus congestion and drainage a few weeks ago that was green in coloration. She lost her voice and went to the [address] [place] [address] which diagnosed her with acute laryngitis and sinusitis and treated her with amoxicillin. She states that she believes that she is supposed to be on 7-day course but is only given 7 pills so took it only for about 3 and half days. She also took steroids and the drainage cleared but seem to settle down into her chest.  She has a significant history of asthmatic bronchitis. She states that she has been using her Dulera inhaler twice daily lost her rescue inhaler but it does not seem to be opening up her lungs that she would expect. She states that this feels different from her asthma. She took a COVID test that was over-the-counter and has all been negative at this point. No known sick contacts. No other complaints, modifying factors or associated symptoms. I have reviewed the following from the nursing documentation.     Past Medical History:   Diagnosis Date    Abnormal Pap smear of cervix     ,x1 cryosurgery,then normal    Glaucoma     H/O varicella     childhood    Primary open angle glaucoma     Dr. Akbar Hamilton    PS[facility]ox[facility] 10/9/2019     Past Surgical History:   Procedure Laterality Date    WISDOM TOOTH EXTRACTION  in 2006     Family History   Problem Relation Age of Onset    Cancer Mother 39        breast    Heart Disease Father     Arthritis Maternal Grandmother     High Blood Pressure Maternal Grandmother     High Cholesterol Maternal Grandmother     Stroke Maternal Grandmother     High Blood Pressure Maternal Grandfather     High Cholesterol Maternal Grandfather     Cancer Paternal Grandmother      Social History     Socioeconomic History    Marital status:      Spouse name: Not on file    Number of children: Not on file    Years of education: Not on file    Highest education level: Not on file   Occupational History    Occupation:  transport    Tobacco Use    Smoking status: Never    Smokeless tobacco: Never   Vaping Use    Vaping Use: Never used   Substance and Sexual Activity    Alcohol use: Yes     Comment: occasional    Drug use: No    Sexual activity: Yes     Partners: Male   Other Topics Concern    Not on file   Social History Narrative    Not exercising regularly, diet healthy     Social Determinants of Health     Financial Resource Strain: Not on file   Food Insecurity: Not on file   Transportation Needs: Not on file   Physical Activity: Not on file   Stress: Not on file   Social Connections: Not on file   Intimate Partner Violence: Not on file   Housing Stability: Not on file     No current facility-administered medications for this encounter.      Current Outpatient Medications   Medication Sig Dispense Refill    albuterol (ACCUNEB) 0.63 MG/3ML nebulizer solution Take 1 ampule by nebulization every 6 hours as needed for Wheezing      predniSONE (DELTASONE) 50 MG tablet Take 1 tablet by mouth daily for 5 days 5 tablet 0    azithromycin (ZITHROMAX) 250 MG tablet Take 1 tablet by mouth See Admin Instructions for 5 days 500mg on day 1 followed by 250mg on days 2 - 5 6 tablet 0    mometasone-formoterol (DULERA) 100-5 MCG/ACT inhaler Inhale 2 puffs into the lungs 2 times daily 1 each 5    timolol (TIMOPTIC) 0.5 % ophthalmic solution       metoprolol succinate (TOPROL XL) 50 MG extended release tablet Take 50 mg by mouth daily      Latanoprost (XALATAN OP) Apply  to eye. Allergies   Allergen Reactions    [place] [Macadamia Nut Oil] Other (See Comments)     Itchy tongue and swelling    Cashew Nut Oil Other (See Comments)     Itchy tongue and swelling    Tobacco [Tobacco] Hives, Itching and Swelling    Lactase-Lactobacillus Diarrhea and Nausea Only       PHYSICAL EXAM  /79   Pulse 94   Temp 98.7 °F (37.1 °C) (Oral)   Resp 16   Ht 5' 11\" (1.803 m)   Wt 143 lb (64.9 kg)   LMP 02/14/2023   SpO2 100%   BMI 19.94 kg/m²    GENERAL APPEARANCE: Awake and alert. Cooperative. No acute distress. HENT: Normocephalic. Atraumatic. Mucous membranes are moist.  No drooling or stridor. Minimal posterior oropharyngeal erythema without exudate. No unilateral swelling fullness of tonsillar pillars which are symmetric bilaterally. Uvula midline and nonedematous. TMs without any noted erythema or bulging. No drainage within the ear canals which are nonerythematous  NECK: Supple. No cervical lymphadenopathy. No nuchal rigidity. EYES: PERRL. EOM's grossly intact. HEART/CHEST: RRR. No murmurs. LUNGS: Respirations unlabored. CTAB. No appreciable wheezes rales or rhonchi. Good air exchange. Speaking comfortably in full sentences. ABDOMEN: No tenderness. Soft. Non-distended. No masses. No organomegaly. No guarding or rebound. SKIN: Warm and dry. No acute rashes. NEUROLOGICAL: Alert and oriented. No gross focal deficits appreciated. PSYCHIATRIC: Normal mood and affect. LABS  I have reviewed all labs for this visit.    Results for orders placed or performed during the hospital encounter of 03/05/23   COVID-19, Rapid    Specimen: Nasopharyngeal Swab   Result Value Ref Range    SARS-CoV-2, NAAT Not Detected Not Detected   Rapid influenza A/B antigens    Specimen: Nasopharyngeal   Result Value Ref Range    Rapid Influenza A Ag Negative Negative Rapid Influenza B Ag Negative Negative       RADIOLOGY  XR CHEST (2 VW)    Result Date: 3/5/2023  EXAMINATION: TWO XRAY VIEWS OF THE CHEST 3/5/2023 11:01 am COMPARISON: 08/21/2021 HISTORY: ORDERING SYSTEM PROVIDED HISTORY: cough TECHNOLOGIST PROVIDED HISTORY: Reason for exam:->cough Reason for Exam: Cough FINDINGS: The lungs are without acute focal process.  There is no effusion or pneumothorax. The cardiomediastinal silhouette is stable. The osseous structures are stable.     No acute process.        ED COURSE/MDM  Patient presenting for evaluation of productive cough, generalized malaise and fever.  I am concerned for possible pneumonia versus flu or COVID as primary likely etiologies.  She does not have any continued significant sinusitis symptoms.  COVID and flu were both obtained and negative.  Chest x-ray showed no acute process but given her constellation of symptoms I am concerned still for clinically an atypical type of pneumonia therefore will cover with oral antibiotics for bacterial pneumonia clinically.  She has no hypoxia or increased work of breathing therefore I did not feel that inpatient admission was indicated.  Will discharge home with the antibiotics as discussed but also given her asthmatic history, despite no current wheezing, I will treat her with a steroid burst to help with any possible inflammatory/reactive airway component.  Patient felt very comfortable with that plan.  Reasons to return to the emergency department were discussed at length and all questions answered at time of discharge.      I have personally reviewed chest xray images and radiology confirms the interpretation:  No confluent infiltrate, pneumothorax or pleural effusion appreciated        Sepsis:  Is this patient to be included in the SEP-1 Core Measure due to severe sepsis or septic shock?   No   Exclusion criteria - the patient is NOT to be included for SEP-1 Core Measure due to:  2+ SIRS criteria are not met  Labs and imaging reviewed and results discussed with patient. Patient was reassessed as noted above . Plan of care discussed with patient. Patient in agreement with plan. Strict return precautions have been given. I, Dr. Cherylene Smalls, MD, am the primary clinician of record. During the patient's ED course, the patient was given:  Medications - No data to display     CLINICAL IMPRESSION  1. Atypical pneumonia    2. History of asthma        Blood pressure 138/79, pulse 94, temperature 98.7 °F (37.1 °C), temperature source Oral, resp. rate 16, height 5' 11\" (1.803 m), weight 143 lb (64.9 kg), last menstrual period 2023, SpO2 100 %, currently breastfeeding. Raul Cochran was discharged to home in stable condition. Patient was given scripts for the following medications. I counseled patient how to take these medications. New Prescriptions    AZITHROMYCIN (ZITHROMAX) 250 MG TABLET    Take 1 tablet by mouth See Admin Instructions for 5 days 500mg on day 1 followed by 250mg on days 2 - 5    PREDNISONE (DELTASONE) 50 MG TABLET    Take 1 tablet by mouth daily for 5 days       Follow-up with: Shera Apgar, [address], [address] [address]  [phone]    Schedule an appointment as soon as possible for a visit in 2 days  For recheck    DISCLAIMER: This chart was created using Dragon dictation software. Efforts were made by me to ensure accuracy, however some errors may be present due to limitations of this technology and occasionally words are not transcribed correctly.         Cherylene Smalls, MD  [phone]

## 2023-05-08 ENCOUNTER — OFFICE VISIT (OUTPATIENT)
Dept: FAMILY MEDICINE CLINIC | Age: 43
End: 2023-05-08
Payer: COMMERCIAL

## 2023-05-08 VITALS
SYSTOLIC BLOOD PRESSURE: 100 MMHG | DIASTOLIC BLOOD PRESSURE: 68 MMHG | BODY MASS INDEX: 19.94 KG/M2 | OXYGEN SATURATION: 99 % | WEIGHT: 142.4 LBS | HEART RATE: 81 BPM | HEIGHT: 71 IN

## 2023-05-08 DIAGNOSIS — H10.9 CONJUNCTIVITIS OF RIGHT EYE, UNSPECIFIED CONJUNCTIVITIS TYPE: Primary | ICD-10-CM

## 2023-05-08 PROCEDURE — 99213 OFFICE O/P EST LOW 20 MIN: CPT | Performed by: FAMILY MEDICINE

## 2023-05-08 RX ORDER — CIPROFLOXACIN HYDROCHLORIDE 3.5 MG/ML
1 SOLUTION/ DROPS TOPICAL 4 TIMES DAILY
Qty: 1 EACH | Refills: 0 | Status: SHIPPED | OUTPATIENT
Start: 2023-05-08 | End: 2023-05-18

## 2023-05-08 SDOH — ECONOMIC STABILITY: TRANSPORTATION INSECURITY
IN THE PAST 12 MONTHS, HAS LACK OF TRANSPORTATION KEPT YOU FROM MEETINGS, WORK, OR FROM GETTING THINGS NEEDED FOR DAILY LIVING?: NO

## 2023-05-08 SDOH — ECONOMIC STABILITY: FOOD INSECURITY: WITHIN THE PAST 12 MONTHS, YOU WORRIED THAT YOUR FOOD WOULD RUN OUT BEFORE YOU GOT MONEY TO BUY MORE.: NEVER TRUE

## 2023-05-08 SDOH — ECONOMIC STABILITY: FOOD INSECURITY: WITHIN THE PAST 12 MONTHS, THE FOOD YOU BOUGHT JUST DIDN'T LAST AND YOU DIDN'T HAVE MONEY TO GET MORE.: NEVER TRUE

## 2023-05-08 SDOH — ECONOMIC STABILITY: INCOME INSECURITY: HOW HARD IS IT FOR YOU TO PAY FOR THE VERY BASICS LIKE FOOD, HOUSING, MEDICAL CARE, AND HEATING?: NOT VERY HARD

## 2023-05-08 SDOH — ECONOMIC STABILITY: HOUSING INSECURITY
IN THE LAST 12 MONTHS, WAS THERE A TIME WHEN YOU DID NOT HAVE A STEADY PLACE TO SLEEP OR SLEPT IN A SHELTER (INCLUDING NOW)?: NO

## 2023-05-08 ASSESSMENT — PATIENT HEALTH QUESTIONNAIRE - PHQ9
SUM OF ALL RESPONSES TO PHQ QUESTIONS 1-9: 0
10. IF YOU CHECKED OFF ANY PROBLEMS, HOW DIFFICULT HAVE THESE PROBLEMS MADE IT FOR YOU TO DO YOUR WORK, TAKE CARE OF THINGS AT HOME, OR GET ALONG WITH OTHER PEOPLE: 0
8. MOVING OR SPEAKING SO SLOWLY THAT OTHER PEOPLE COULD HAVE NOTICED. OR THE OPPOSITE, BEING SO FIGETY OR RESTLESS THAT YOU HAVE BEEN MOVING AROUND A LOT MORE THAN USUAL: 0
SUM OF ALL RESPONSES TO PHQ QUESTIONS 1-9: 0
SUM OF ALL RESPONSES TO PHQ QUESTIONS 1-9: 0
1. LITTLE INTEREST OR PLEASURE IN DOING THINGS: 0
9. THOUGHTS THAT YOU WOULD BE BETTER OFF DEAD, OR OF HURTING YOURSELF: 0
5. POOR APPETITE OR OVEREATING: 0
SUM OF ALL RESPONSES TO PHQ QUESTIONS 1-9: 0
4. FEELING TIRED OR HAVING LITTLE ENERGY: 0
3. TROUBLE FALLING OR STAYING ASLEEP: 0
SUM OF ALL RESPONSES TO PHQ9 QUESTIONS 1 & 2: 0
6. FEELING BAD ABOUT YOURSELF - OR THAT YOU ARE A FAILURE OR HAVE LET YOURSELF OR YOUR FAMILY DOWN: 0
2. FEELING DOWN, DEPRESSED OR HOPELESS: 0
7. TROUBLE CONCENTRATING ON THINGS, SUCH AS READING THE NEWSPAPER OR WATCHING TELEVISION: 0

## 2023-05-08 ASSESSMENT — ENCOUNTER SYMPTOMS
PHOTOPHOBIA: 0
EYE REDNESS: 1
EYE DISCHARGE: 1
SINUS PRESSURE: 0
SORE THROAT: 0
EYE PAIN: 0
EYE ITCHING: 1
RHINORRHEA: 0

## 2023-05-08 NOTE — PROGRESS NOTES
Patient:  Lloyd Moe a 41 y.o. femalewho presents today with the following Chief Complaint(s):  Chief Complaint   Patient presents with    Conjunctivitis     Pt states that she woke up this morning with her right eye crusted shut and very itchy. Patient is here for evaluation of right eye redness. She was cleaning out her chicken coop over the weekend and then this morning when she woke up her right eye was matted shut with yellow discharge. She said actually had dripped down onto her pillowcase as well. Her eyelids were very swollen when she first woke up. Is a day has gone on the swelling has improved. Her eye is itchy. She does wear contacts. She denies foreign body sensation. There is no known trauma to her eye. No one else at home has similar symptoms. She denies blurring vision or eye pain. Or other URI symptoms        Current Outpatient Medications   Medication Sig Dispense Refill    ciprofloxacin (CILOXAN) 0.3 % ophthalmic solution Place 1 drop into both eyes 4 times daily for 10 days 1 each 0    albuterol (ACCUNEB) 0.63 MG/3ML nebulizer solution Take 3 mLs by nebulization every 6 hours as needed for Wheezing      mometasone-formoterol (DULERA) 100-5 MCG/ACT inhaler Inhale 2 puffs into the lungs 2 times daily 1 each 5    timolol (TIMOPTIC) 0.5 % ophthalmic solution       Latanoprost (XALATAN OP) Apply  to eye. No current facility-administered medications for this visit. Patients past medical history, surgical history, family history, medications and allergies were all reviewed and updated as appropriate today. Review of Systems   Constitutional:  Negative for fever. HENT:  Negative for rhinorrhea, sinus pressure and sore throat. Eyes:  Positive for discharge, redness and itching. Negative for photophobia, pain and visual disturbance. Physical Exam  Vitals reviewed. Eyes:      General:         Right eye: No discharge.          Left eye: No

## 2023-05-13 ENCOUNTER — HOSPITAL ENCOUNTER (EMERGENCY)
Age: 43
Discharge: HOME | End: 2023-05-13
Attending: EMERGENCY MEDICINE
Payer: COMMERCIAL

## 2023-05-13 VITALS
SYSTOLIC BLOOD PRESSURE: 116 MMHG | BODY MASS INDEX: 19.32 KG/M2 | TEMPERATURE: 97.8 F | WEIGHT: 138 LBS | RESPIRATION RATE: 18 BRPM | HEART RATE: 88 BPM | OXYGEN SATURATION: 99 % | HEIGHT: 71 IN | DIASTOLIC BLOOD PRESSURE: 83 MMHG

## 2023-05-13 DIAGNOSIS — H10.9 CONJUNCTIVITIS OF BOTH EYES, UNSPECIFIED CONJUNCTIVITIS TYPE: Primary | ICD-10-CM

## 2023-05-13 PROCEDURE — 99282 EMERGENCY DEPT VISIT SF MDM: CPT

## 2023-05-13 ASSESSMENT — PAIN SCALES - GENERAL
PAINLEVEL_OUTOF10: 3
PAINLEVEL_OUTOF10: 3

## 2023-05-13 ASSESSMENT — PAIN DESCRIPTION - DESCRIPTORS
DESCRIPTORS: BURNING;ITCHING
DESCRIPTORS: BURNING;ITCHING

## 2023-05-13 ASSESSMENT — PAIN DESCRIPTION - PAIN TYPE
TYPE: ACUTE PAIN
TYPE: ACUTE PAIN

## 2023-05-13 ASSESSMENT — PAIN - FUNCTIONAL ASSESSMENT
PAIN_FUNCTIONAL_ASSESSMENT: ACTIVITIES ARE NOT PREVENTED
PAIN_FUNCTIONAL_ASSESSMENT: ACTIVITIES ARE NOT PREVENTED
PAIN_FUNCTIONAL_ASSESSMENT: 0-10
PAIN_FUNCTIONAL_ASSESSMENT: 0-10

## 2023-05-13 ASSESSMENT — PAIN DESCRIPTION - FREQUENCY
FREQUENCY: CONTINUOUS
FREQUENCY: CONTINUOUS

## 2023-05-13 ASSESSMENT — PAIN DESCRIPTION - LOCATION
LOCATION: EYE
LOCATION: EYE

## 2023-05-13 ASSESSMENT — PAIN DESCRIPTION - ORIENTATION
ORIENTATION: RIGHT
ORIENTATION: RIGHT

## 2023-05-13 ASSESSMENT — PAIN DESCRIPTION - ONSET
ONSET: ON-GOING
ONSET: ON-GOING

## 2023-05-13 NOTE — ED PROVIDER NOTES
Emergency Department Provider Note  Location: [place] [address] [place]  5/13/2023     Patient Identification  Eric Noland is a 41 y.o. female    Chief Complaint  Eye Pain (Pain x1 week was seen by PCP. [facility] pts PCP was not sure if it was pink eye or allergies. [facility] PCP gave her meds for both. Eye not getting any better )          HPI  (History provided by patient)  Patient is a 19-year-old female who presents with 1 week of conjunctivitis symptoms. Patient reports her right eye is now slightly worse than the left eye but both are inflamed. Has irritation of the surface of her eye. She denies any intraorbital or retro-orbital pain headache no vision changes or photophobia. She denies any concern for foreign body. Saw her primary care over the past week who stated that it could be allergy induced conjunctivitis versus infectious and prescribed antihistamine medications and Cipro eyedrops. Patient does wear contacts at times. She has a history of glaucoma which is controlled. I have reviewed the following nursing documentation:  Allergies: Allergies   Allergen Reactions    [place] [Macadamia Nut Oil] Other (See Comments)     Itchy tongue and swelling    Cashew Nut Oil Other (See Comments)     Itchy tongue and swelling    Tobacco [Tobacco] Hives, Itching and Swelling    Lactase-Lactobacillus Diarrhea and Nausea Only       Past medical history:  has a past medical history of Abnormal Pap smear of cervix, Glaucoma, H/O varicella, Primary open angle glaucoma, and PSVT (paroxysmal supraventricular tachycardia) ([facility] (10/9/2019). Past surgical history:  has a past surgical history that includes [place] tooth extraction (in 2006). Home medications:   Prior to Admission medications    Medication Sig Start Date End Date Taking?  Authorizing Provider   ciprofloxacin (CILOXAN) 0.3 % ophthalmic solution Place 1 drop into both eyes 4 times daily for 10 days 5/8/23 5/18/23  Austin Arias MD

## 2023-05-13 NOTE — DISCHARGE INSTRUCTIONS
Continue current antibiotics as well as your allergy medications. If your symptoms getting worse recommend that you follow-up with [facility]. Hope you feel better.

## 2023-12-28 ENCOUNTER — OFFICE VISIT (OUTPATIENT)
Dept: FAMILY MEDICINE CLINIC | Age: 43
End: 2023-12-28
Payer: COMMERCIAL

## 2023-12-28 VITALS
HEART RATE: 87 BPM | BODY MASS INDEX: 20.58 KG/M2 | OXYGEN SATURATION: 98 % | RESPIRATION RATE: 18 BRPM | SYSTOLIC BLOOD PRESSURE: 112 MMHG | WEIGHT: 147 LBS | TEMPERATURE: 97.6 F | DIASTOLIC BLOOD PRESSURE: 62 MMHG | HEIGHT: 71 IN

## 2023-12-28 DIAGNOSIS — M54.50 ACUTE LEFT-SIDED LOW BACK PAIN WITHOUT SCIATICA: Primary | ICD-10-CM

## 2023-12-28 DIAGNOSIS — R30.0 DYSURIA: ICD-10-CM

## 2023-12-28 LAB
BILIRUBIN, POC: NEGATIVE
BLOOD URINE, POC: NEGATIVE
CLARITY, POC: NORMAL
COLOR, POC: NORMAL
GLUCOSE URINE, POC: NEGATIVE
KETONES, POC: NEGATIVE
LEUKOCYTE EST, POC: NEGATIVE
NITRITE, POC: NEGATIVE
PH, POC: 7
PROTEIN, POC: NEGATIVE
SPECIFIC GRAVITY, POC: 1.02
UROBILINOGEN, POC: 0.2

## 2023-12-28 PROCEDURE — 81002 URINALYSIS NONAUTO W/O SCOPE: CPT | Performed by: PHYSICIAN ASSISTANT

## 2023-12-28 PROCEDURE — 99214 OFFICE O/P EST MOD 30 MIN: CPT | Performed by: PHYSICIAN ASSISTANT

## 2023-12-28 RX ORDER — FLUTICASONE FUROATE, UMECLIDINIUM BROMIDE AND VILANTEROL TRIFENATATE 100; 62.5; 25 UG/1; UG/1; UG/1
1 POWDER RESPIRATORY (INHALATION) DAILY
COMMUNITY
Start: 2023-11-14

## 2023-12-28 RX ORDER — EPINEPHRINE 0.3 MG/.3ML
INJECTION SUBCUTANEOUS PRN
COMMUNITY
Start: 2023-11-08

## 2023-12-28 NOTE — PROGRESS NOTES
[address]  [facility] MEDICINE  12/28/23       IMPRESSION/ PLAN   Dysuria  -Occurred after dyspareunia. Resolved after one day. -Continue to monitor    Acute left-sided low back pain without sciatica  -     UA negative. No findings of renal calculi, UTI, shingles, or muscle spasm. Monitor. Ibuprofen prn pain. If unresolved or new symptoms return to office    [address]  Chief Complaint   Patient presents with    Back Pain     Pt is here with complaints of left sided low back pain and frequent urge to urinate. Dysuria     HISTORY OF PRESENT  ILLNESS  Dutch Suh is a 41 y.o.  female  c/o 2 days    left thoracic region backache that moved to right side. Awoke her. No meds tried. Dysuria and dyspareunia gone. No hematuria. No suprapubic or flank pains. Had a day of dysuria after single episode of dyspareunia but both of these issues has resolved. No h/o UTI or stones. NO fever, rashes. ROS:  Remaining 14 ROS were reviewed and are unremarkable for other constitutional, EENT, cardiac, pulmonary, GI, , neurologic, musculoskeletal, or integumentary complaints. PAST MEDICAL/SURGICAL, SOCIAL, &  FAMILY HISTORY:  Reviewed and updated accordingly. ALLERGIES : [place] [macadamia nut oil], Cashew nut oil, Tobacco [tobacco], Lactase-lactobacillus, and Agave    MEDICATIONS:  Current Outpatient Medications on File Prior to Visit   Medication Sig Dispense Refill    TRELEGY ELLIPTA 100-62.5-25 MCG/ACT AEPB inhaler Inhale 1 puff into the lungs daily      EPINEPHrine (EPIPEN) 0.3 MG/0.3ML SOAJ injection as needed      albuterol (ACCUNEB) 0.63 MG/3ML nebulizer solution Take 3 mLs by nebulization every 6 hours as needed for Wheezing      timolol (TIMOPTIC) 0.5 % ophthalmic solution       Latanoprost (XALATAN OP) Apply  to eye. No current facility-administered medications on file prior to visit.         PHYSICAL EXAM     Vitals:    12/28/23 1529   BP: 112/62   Pulse: 87   Resp: 18   Temp:

## 2024-11-04 NOTE — PROGRESS NOTES
Chief Complaint:  Knee Pain (Left Knee Pain)      SUBJECTIVE:  Arely Malhotra is a 47 y.o. female who returns today for evaluation of left knee, she sustained a patella dislocation in February, I saw her and we elected conservative treatment. Patient was able to complete a handful of physical therapy visits before having to stop with the pandemic. She has continued to do her home exercises and thinks she regained full strength however she continues to anterior pain and a feeling of instability at the patella especially on uneven ground. She has a history of patella dislocation over 25 years ago as well. She continues to have intermittent swelling which improves with ice and anti-inflammatories. She does have a patella stabilizing brace and wears it on occasion. Pain Assessment:  Pain Assessment  Location of Pain: Knee  Location Modifiers: Left  Severity of Pain: 1  Quality of Pain: Sharp, Dull, Aching  Duration of Pain: Persistent  Frequency of Pain: Constant  Aggravating Factors: Stairs, Walking, Standing, Squatting, Kneeling, Exercise, Straightening, Stretching, Bending  Limiting Behavior: Yes  Relieving Factors: Rest  Result of Injury: No  Work-Related Injury: No  Are there other pain locations you wish to document?: No      Medical History:  Patient's medications, allergies, past medical, surgical, social and family histories were reviewed and updated as appropriate. Review of Systems:  Constitutional: negative  Respiratory: negative  Cardiovascular: negative  Musculoskeletal:negative except for Knee Pain (Left Knee Pain)    Relevant review of systems reviewed and available in the patient's chart in media tab    General Exam:   Constitutional: Patient is adequately groomed with no evidence of malnutrition  Mental Status: The patient is oriented to time, place and person. The patient's mood and affect are appropriate. Vascular: Examination reveals no swelling or calf tenderness.   Peripheral pulses are palpable and 2+. OBJECTIVE:  Vital Signs:  Vitals:    10/15/20 1349   Weight: 151 lb 0.2 oz (68.5 kg)   Height: 5' 10.98\" (1.803 m)       Appearance: alert, well appearing, and in no distress, oriented to person, place, and time and normal appearing weight. Physical exam:   Left knee shows mild effusion, she has 3+ quadrants with lateral translation apprehension. She has full range of motion 0 to 120 degrees. Mild tenderness to the medial lateral joint line. ACL, MCL, PCL LCL are stable stress exam.  Distal vascular exam is intact. Assessment : Left knee patella dislocation without significant improvement    Impression:  Encounter Diagnosis   Name Primary?  Patellar dislocation, left, initial encounter Yes       Office Procedures:  Orders Placed This Encounter   Procedures    MRI KNEE LEFT WO CONTRAST     Scheduling Instructions:      [facility], [address] [employer] [address]      [phone]      FAX: [phone]            **PUSH IMAGES TO [employer] PACS**                  PENDING APPROVAL FROM PRE-CERT DEPARTMENT             TIME AND DATE PATIENT AVAILABILITY:                   Tuesday/Thursday after 3 or any morning Monday-Friday     Order Specific Question:   Reason for exam:     Answer:   R/O MPFL, PATELLAR DISLOCATION     No orders of the defined types were placed in this encounter. Treatment Plan: Based on patient's history and physical exam I'm concerned about MPFL injury, chondral defect, TT TG distance therefore I would like to obtain an MRI to further evaluate. Once the MRI is obtained the patient will follow up with me for further evaluation and discussion. Patient agrees with this plan, all of their questions were answered best of our ability and to their satisfaction. Continue with anti-inflammatories, use of brace, ice and activity modification until after MRI.     Joselyn Ventura MD Office

## 2025-05-30 ENCOUNTER — OFFICE VISIT (OUTPATIENT)
Dept: FAMILY MEDICINE CLINIC | Age: 45
End: 2025-05-30
Payer: COMMERCIAL

## 2025-05-30 VITALS
HEART RATE: 68 BPM | OXYGEN SATURATION: 99 % | WEIGHT: 150.8 LBS | SYSTOLIC BLOOD PRESSURE: 122 MMHG | BODY MASS INDEX: 21.11 KG/M2 | HEIGHT: 71 IN | DIASTOLIC BLOOD PRESSURE: 66 MMHG

## 2025-05-30 DIAGNOSIS — Z91.018 MULTIPLE FOOD ALLERGIES: ICD-10-CM

## 2025-05-30 DIAGNOSIS — L50.9 HIVES: Primary | ICD-10-CM

## 2025-05-30 PROCEDURE — 99213 OFFICE O/P EST LOW 20 MIN: CPT | Performed by: FAMILY MEDICINE

## 2025-05-30 SDOH — ECONOMIC STABILITY: FOOD INSECURITY: WITHIN THE PAST 12 MONTHS, YOU WORRIED THAT YOUR FOOD WOULD RUN OUT BEFORE YOU GOT MONEY TO BUY MORE.: NEVER TRUE

## 2025-05-30 SDOH — ECONOMIC STABILITY: FOOD INSECURITY: WITHIN THE PAST 12 MONTHS, THE FOOD YOU BOUGHT JUST DIDN'T LAST AND YOU DIDN'T HAVE MONEY TO GET MORE.: NEVER TRUE

## 2025-05-30 ASSESSMENT — PATIENT HEALTH QUESTIONNAIRE - PHQ9
10. IF YOU CHECKED OFF ANY PROBLEMS, HOW DIFFICULT HAVE THESE PROBLEMS MADE IT FOR YOU TO DO YOUR WORK, TAKE CARE OF THINGS AT HOME, OR GET ALONG WITH OTHER PEOPLE: NOT DIFFICULT AT ALL
4. FEELING TIRED OR HAVING LITTLE ENERGY: NOT AT ALL
1. LITTLE INTEREST OR PLEASURE IN DOING THINGS: NOT AT ALL
8. MOVING OR SPEAKING SO SLOWLY THAT OTHER PEOPLE COULD HAVE NOTICED. OR THE OPPOSITE, BEING SO FIGETY OR RESTLESS THAT YOU HAVE BEEN MOVING AROUND A LOT MORE THAN USUAL: NOT AT ALL
6. FEELING BAD ABOUT YOURSELF - OR THAT YOU ARE A FAILURE OR HAVE LET YOURSELF OR YOUR FAMILY DOWN: NOT AT ALL
9. THOUGHTS THAT YOU WOULD BE BETTER OFF DEAD, OR OF HURTING YOURSELF: NOT AT ALL
5. POOR APPETITE OR OVEREATING: NOT AT ALL
SUM OF ALL RESPONSES TO PHQ QUESTIONS 1-9: 0
2. FEELING DOWN, DEPRESSED OR HOPELESS: NOT AT ALL
3. TROUBLE FALLING OR STAYING ASLEEP: NOT AT ALL
SUM OF ALL RESPONSES TO PHQ QUESTIONS 1-9: 0
7. TROUBLE CONCENTRATING ON THINGS, SUCH AS READING THE NEWSPAPER OR WATCHING TELEVISION: NOT AT ALL
SUM OF ALL RESPONSES TO PHQ QUESTIONS 1-9: 0
SUM OF ALL RESPONSES TO PHQ QUESTIONS 1-9: 0

## 2025-05-30 NOTE — PROGRESS NOTES
Patient:  Sweta Delgado a 45 y.o. femalewho presents today with the following Chief Complaint(s):  Chief Complaint   Patient presents with    Allergies     Pt states that she is wanting to have food allergy testing done. 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 She gets hives, abdominal cramping with some foods.        Since she turned 42, she has developed a variety of food allergies.  She is allergic to tree nuts.  She has been seen by an allergist.  They did work with her on environmental allergies and skin testing for plant allergies and nut allergy.  Patient has been having issues with hives and facial swelling lip swelling after she eats foods.  She is not clear on which foods is triggering these results.  Patient also has been seen by GI.  She has eliminated milk from her diet and that has dramatically helped her GI symptoms.  They referred her here for food allergy testing          Current Outpatient Medications   Medication Sig Dispense Refill    TRELEGY ELLIPTA 100-62.5-25 MCG/ACT AEPB inhaler Inhale 1 puff into the lungs daily      EPINEPHrine (EPIPEN) 0.3 MG/0.3ML SOAJ injection as needed      albuterol (ACCUNEB) 0.63 MG/3ML nebulizer solution Take 3 mLs by nebulization every 6 hours as needed for Wheezing      Latanoprost (XALATAN OP) Apply  to eye.       No current facility-administered medications for this visit.       Patients past medical history, surgical history, family history, medications and allergies were all reviewed and updated as appropriate today.      Review of Systems   Allergic/Immunologic: Positive for environmental allergies and food allergies. Negative for immunocompromised state.        Physical Exam  Vitals reviewed.   Constitutional:       General: She is not in acute distress.     Appearance: She is not ill-appearing.   Cardiovascular:      Heart sounds: Normal heart sounds.   Pulmonary:      Breath

## 2025-06-05 LAB
BARLEY IGE QN: <0.1 KU/L (ref 0–0.34)
BEEF IGE QN: <0.1 KU/L (ref 0–0.34)
BELL PEPPER IGE QN: <0.1 KU/L (ref 0–0.34)
CABBAGE IGE QN: <0.1 KU/L (ref 0–0.34)
CARROT IGE QN: <0.1 KU/L (ref 0–0.34)
CHICKEN SERUM PROT IGE QN: <0.1 KU/L (ref 0–0.34)
CODFISH IGE QN: <0.1 KU/L (ref 0–0.34)
CORN IGE QN: <0.1 KU/L (ref 0–0.34)
COW MILK IGE QN: <0.1 KU/L (ref 0–0.34)
CRAB IGE QN: <0.1 KU/L (ref 0–0.34)
EGG WHITE IGE QN: <0.1 KU/L (ref 0–0.34)
GRAPE IGE QN: <0.1 KU/L (ref 0–0.34)
IGE SERPL-ACNC: 4 IU/ML (ref 0–100)
LETTUCE IGE QN: <0.1 KU/L (ref 0–0.34)
OAT IGE QN: <0.1 KU/L (ref 0–0.34)
ORANGE IGE QN: <0.1 KU/L (ref 0–0.34)
PEANUT IGE QN: <0.1 KU/L (ref 0–0.34)
PORK IGE QN: <0.1 KU/L (ref 0–0.34)
POTATO IGE QN: <0.1 KU/L (ref 0–0.34)
RICE IGE QN: <0.1 KU/L (ref 0–0.34)
RYE IGE QN: <0.1 KU/L (ref 0–0.34)
SHRIMP IGE QN: <0.1 KU/L (ref 0–0.34)
SOYBEAN IGE QN: <0.1 KU/L (ref 0–0.34)
TOMATO IGE QN: <0.1 KU/L (ref 0–0.34)
TUNA IGE QN: <0.1 KU/L (ref 0–0.34)
WHEAT IGE QN: <0.1 KU/L (ref 0–0.34)
WHITE BEAN IGE QN: <0.1 KU/L (ref 0–0.34)

## 2025-06-11 ENCOUNTER — RESULTS FOLLOW-UP (OUTPATIENT)
Dept: FAMILY MEDICINE CLINIC | Age: 45
End: 2025-06-11